# Patient Record
Sex: FEMALE | Race: WHITE | NOT HISPANIC OR LATINO | Employment: FULL TIME | URBAN - METROPOLITAN AREA
[De-identification: names, ages, dates, MRNs, and addresses within clinical notes are randomized per-mention and may not be internally consistent; named-entity substitution may affect disease eponyms.]

---

## 2017-09-08 ENCOUNTER — OFFICE VISIT (OUTPATIENT)
Dept: OBSTETRICS AND GYNECOLOGY | Facility: CLINIC | Age: 42
End: 2017-09-08
Payer: COMMERCIAL

## 2017-09-08 VITALS
DIASTOLIC BLOOD PRESSURE: 76 MMHG | BODY MASS INDEX: 27.21 KG/M2 | HEIGHT: 64 IN | WEIGHT: 159.38 LBS | SYSTOLIC BLOOD PRESSURE: 122 MMHG

## 2017-09-08 DIAGNOSIS — Z01.419 WELL WOMAN EXAM WITH ROUTINE GYNECOLOGICAL EXAM: ICD-10-CM

## 2017-09-08 DIAGNOSIS — Z12.31 VISIT FOR SCREENING MAMMOGRAM: ICD-10-CM

## 2017-09-08 DIAGNOSIS — Z12.4 ROUTINE PAPANICOLAOU SMEAR: Primary | ICD-10-CM

## 2017-09-08 PROCEDURE — 99999 PR PBB SHADOW E&M-EST. PATIENT-LVL II: CPT | Mod: PBBFAC,,, | Performed by: OBSTETRICS & GYNECOLOGY

## 2017-09-08 PROCEDURE — 88175 CYTOPATH C/V AUTO FLUID REDO: CPT

## 2017-09-08 PROCEDURE — 99396 PREV VISIT EST AGE 40-64: CPT | Mod: S$GLB,,, | Performed by: OBSTETRICS & GYNECOLOGY

## 2017-09-08 NOTE — PROGRESS NOTES
CC: Annual check-up    SUBJECTIVE:   42 y.o. female   for annual routine Pap and checkup. Patient's last menstrual period was 2017 (exact date)..  She has no unusual complaints.        History reviewed. No pertinent past medical history.  History reviewed. No pertinent surgical history.  Social History     Social History    Marital status:      Spouse name: N/A    Number of children: N/A    Years of education: N/A     Occupational History    Not on file.     Social History Main Topics    Smoking status: Current Every Day Smoker    Smokeless tobacco: Current User    Alcohol use Yes    Drug use: No    Sexual activity: Yes     Partners: Male     Birth control/ protection: None     Other Topics Concern    Not on file     Social History Narrative    No narrative on file     History reviewed. No pertinent family history.  OB History    Para Term  AB Living   2 2 1 1   2   SAB TAB Ectopic Multiple Live Births           2      # Outcome Date GA Lbr [place]/2nd Weight Sex Delivery Anes PTL Lv   2 Term 09   3.118 kg (6 lb 14 oz) M Vag-Spont  N ADAM   1  06 35w0d  2.325 kg (5 lb 2 oz) F Vag-Spont  Y ADAM            No current outpatient prescriptions on file.     No current facility-administered medications for this visit.      Allergies: Review of patient's allergies indicates no known allergies.     ROS:  Constitutional: no weight loss, weight gain, fever, fatigue  Eyes:  No vision changes, glasses/contacts  ENT/Mouth: No ulcers, sinus problems, ears ringing, headache  Cardiovascular: No inability to lie flat, chest pain, exercise intolerance, swelling, heart palpitations  Respiratory: No wheezing, coughing blood, shortness of breath, or cough  Gastrointestinal: No diarrhea, bloody stool, nausea/vomiting, constipation, gas, hemorrhoids  Genitourinary: No blood in urine, painful urination, urgency of urination, frequency of urination, incomplete emptying, incontinence,  "abnormal bleeding, painful periods, heavy periods, vaginal discharge, vaginal odor, painful intercourse, sexual problems, bleeding after intercourse.  Musculoskeletal: No muscle weakness  Skin/Breast: No painful breasts, nipple discharge, masses, rash, ulcers  Neurological: No passing out, seizures, numbness, headache  Endocrine: No diabetes, hypothyroid, hyperthyroid, hot flashes, hair loss, abnormal hair growth, ance  Psychiatric: No depression, crying  Hematologic: No bruises, bleeding, swollen lymph nodes, anemia.      OBJECTIVE:   The patient appears well, alert, oriented x 3, in no distress.  /76   Ht 5' 4" (1.626 m)   Wt 72.3 kg (159 lb 6.4 oz)   LMP 08/22/2017 (Exact Date)   BMI 27.36 kg/m²   NECK: no thyromegaly, trachea midline  SKIN: no acne, striae, hirsutism  BREAST EXAM: breasts appear normal, no suspicious masses, no skin or nipple changes or axillary nodes, symmetric fibrous changes in both upper outer quadrants  ABDOMEN: no hernias, masses, or hepatosplenomegaly  GENITALIA: normal external genitalia, no erythema, no discharge  URETHRA: normal urethra, normal urethral meatus  VAGINA: Normal  CERVIX: no lesions or cervical motion tenderness  UTERUS: normal  ADNEXA: normal adnexa      ASSESSMENT:   well woman  1. Routine Papanicolaou smear    2. Visit for screening mammogram        PLAN:   mammogram  pap smear  return annually or prn     "

## 2017-09-20 ENCOUNTER — HOSPITAL ENCOUNTER (OUTPATIENT)
Dept: RADIOLOGY | Facility: HOSPITAL | Age: 42
Discharge: HOME | End: 2017-09-20
Attending: OBSTETRICS & GYNECOLOGY
Payer: COMMERCIAL

## 2017-09-20 VITALS — WEIGHT: 159 LBS | BODY MASS INDEX: 27.14 KG/M2 | HEIGHT: 64 IN

## 2017-09-20 DIAGNOSIS — Z12.31 VISIT FOR SCREENING MAMMOGRAM: ICD-10-CM

## 2017-09-20 PROCEDURE — 77067 SCR MAMMO BI INCL CAD: CPT | Mod: TC

## 2018-02-21 ENCOUNTER — OFFICE VISIT (OUTPATIENT)
Dept: FAMILY MEDICINE | Facility: CLINIC | Age: 43
End: 2018-02-21
Payer: COMMERCIAL

## 2018-02-21 VITALS
BODY MASS INDEX: 28.54 KG/M2 | SYSTOLIC BLOOD PRESSURE: 120 MMHG | TEMPERATURE: 98 F | HEIGHT: 64 IN | DIASTOLIC BLOOD PRESSURE: 82 MMHG | OXYGEN SATURATION: 98 % | HEART RATE: 85 BPM | WEIGHT: 167.19 LBS

## 2018-02-21 DIAGNOSIS — M25.561 CHRONIC PAIN OF BOTH KNEES: ICD-10-CM

## 2018-02-21 DIAGNOSIS — G89.29 CHRONIC PAIN OF BOTH KNEES: ICD-10-CM

## 2018-02-21 DIAGNOSIS — E66.3 OVERWEIGHT: Primary | ICD-10-CM

## 2018-02-21 DIAGNOSIS — M25.562 CHRONIC PAIN OF BOTH KNEES: ICD-10-CM

## 2018-02-21 PROCEDURE — 99213 OFFICE O/P EST LOW 20 MIN: CPT | Mod: S$GLB,,, | Performed by: FAMILY MEDICINE

## 2018-02-21 PROCEDURE — 3008F BODY MASS INDEX DOCD: CPT | Mod: S$GLB,,, | Performed by: FAMILY MEDICINE

## 2018-02-21 RX ORDER — PHENTERMINE HYDROCHLORIDE 37.5 MG/1
37.5 CAPSULE ORAL EVERY MORNING
Qty: 30 CAPSULE | Refills: 2 | Status: SHIPPED | OUTPATIENT
Start: 2018-02-21 | End: 2018-03-23

## 2018-02-21 NOTE — PROGRESS NOTES
Subjective:      Patient ID: Jessika Sorto is a 42 y.o. female.    Chief Complaint: Weight Gain    Gained weight; wants help to lose; 16 pound documented almost 2 years; snack, sit at work; starting to walk; has 8 year old son; 11 also; had phentermin 2 years ago; reasonable weight goal 140; decrease beer; exercise; cardio; her knees hurt this prevents her from running.  She is not tried Weight Watchers yet she needs to quit smoking      Review of Systems   Constitutional: Positive for unexpected weight change.   HENT: Negative.    Respiratory: Negative.    Cardiovascular: Negative.    Gastrointestinal: Negative.    Endocrine: Negative.    Genitourinary: Negative.    Musculoskeletal: Positive for arthralgias.   Psychiatric/Behavioral: Negative.    All other systems reviewed and are negative.    Objective:     Physical Exam   Constitutional: She is oriented to person, place, and time. She appears well-developed and well-nourished.   Overweight   HENT:   Head: Normocephalic.   Eyes: Conjunctivae and EOM are normal. Pupils are equal, round, and reactive to light.   Neck: Normal range of motion. Neck supple.   Cardiovascular: Normal rate, regular rhythm and normal heart sounds.    Pulmonary/Chest: Effort normal and breath sounds normal.   Musculoskeletal: Normal range of motion.   Neurological: She is alert and oriented to person, place, and time. She has normal reflexes.   Skin: Skin is warm and dry.   Psychiatric: She has a normal mood and affect. Her behavior is normal. Judgment and thought content normal.   Nursing note and vitals reviewed.    Assessment:     1. Overweight    2. Chronic pain of both knees      Plan:     New Prescriptions    PHENTERMINE 37.5 MG CAPSULE    Take 1 capsule (37.5 mg total) by mouth every morning.     Discontinued Medications    No medications on file     Modified Medications    No medications on file       Overweight  Comments:  Phentermine exercise Weight [employer]chers    Chronic pain of  both knees    Other orders  -     phentermine 37.5 MG capsule; Take 1 capsule (37.5 mg total) by mouth every morning.  Dispense: 30 capsule; Refill: 2      [facility] [employer] quit smoking exercise return to clinic in 3 months

## 2018-10-11 ENCOUNTER — OFFICE VISIT (OUTPATIENT)
Dept: FAMILY MEDICINE | Facility: CLINIC | Age: 43
End: 2018-10-11
Payer: COMMERCIAL

## 2018-10-11 VITALS
OXYGEN SATURATION: 98 % | HEIGHT: 64 IN | HEART RATE: 75 BPM | TEMPERATURE: 98 F | BODY MASS INDEX: 26.81 KG/M2 | SYSTOLIC BLOOD PRESSURE: 116 MMHG | DIASTOLIC BLOOD PRESSURE: 78 MMHG | WEIGHT: 157.06 LBS

## 2018-10-11 DIAGNOSIS — M54.50 ACUTE MIDLINE LOW BACK PAIN WITHOUT SCIATICA: Primary | ICD-10-CM

## 2018-10-11 PROCEDURE — 99213 OFFICE O/P EST LOW 20 MIN: CPT | Mod: S$GLB,,, | Performed by: FAMILY MEDICINE

## 2018-10-11 RX ORDER — METHOCARBAMOL 750 MG/1
TABLET, FILM COATED ORAL
COMMUNITY
Start: 2018-09-21 | End: 2019-02-15
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
## 2019-02-15 ENCOUNTER — OFFICE VISIT (OUTPATIENT)
Dept: FAMILY MEDICINE | Facility: CLINIC | Age: 44
End: 2019-02-15
Payer: COMMERCIAL

## 2019-02-15 VITALS
HEART RATE: 106 BPM | SYSTOLIC BLOOD PRESSURE: 120 MMHG | HEIGHT: 64 IN | WEIGHT: 167.69 LBS | OXYGEN SATURATION: 99 % | TEMPERATURE: 98 F | BODY MASS INDEX: 28.63 KG/M2 | DIASTOLIC BLOOD PRESSURE: 80 MMHG

## 2019-02-15 DIAGNOSIS — Z23 NEED FOR TDAP VACCINATION: ICD-10-CM

## 2019-02-15 DIAGNOSIS — K59.00 CONSTIPATION, UNSPECIFIED CONSTIPATION TYPE: Primary | ICD-10-CM

## 2019-02-15 DIAGNOSIS — Z23 NEED FOR PNEUMOCOCCAL VACCINATION: ICD-10-CM

## 2019-02-15 PROCEDURE — 99213 PR OFFICE/OUTPT VISIT, EST, LEVL III, 20-29 MIN: ICD-10-PCS | Mod: S$GLB,,, | Performed by: FAMILY MEDICINE

## 2019-02-15 PROCEDURE — 99213 OFFICE O/P EST LOW 20 MIN: CPT | Mod: S$GLB,,, | Performed by: FAMILY MEDICINE

## 2019-02-15 RX ORDER — GABAPENTIN 100 MG/1
100 CAPSULE ORAL 3 TIMES DAILY
Qty: 90 CAPSULE | Refills: 11 | Status: SHIPPED | OUTPATIENT
Start: 2019-02-15 | End: 2022-01-03

## 2019-02-15 RX ORDER — NITROFURANTOIN 25; 75 MG/1; MG/1
CAPSULE ORAL
COMMUNITY
Start: 2019-02-12 | End: 2022-01-03

## 2019-02-18 ENCOUNTER — TELEPHONE (OUTPATIENT)
Dept: FAMILY MEDICINE | Facility: CLINIC | Age: 44
End: 2019-02-18

## 2019-02-18 DIAGNOSIS — R10.9 ABDOMINAL PAIN, UNSPECIFIED ABDOMINAL LOCATION: Primary | ICD-10-CM

## 2019-02-18 PROCEDURE — 90732 PPSV23 VACC 2 YRS+ SUBQ/IM: CPT | Mod: S$GLB,,, | Performed by: FAMILY MEDICINE

## 2019-02-18 PROCEDURE — 90471 PNEUMOCOCCAL POLYSACCHARIDE VACCINE 23-VALENT =>2YO SQ IM: ICD-10-PCS | Mod: S$GLB,,, | Performed by: FAMILY MEDICINE

## 2019-02-18 PROCEDURE — 90715 TDAP VACCINE 7 YRS/> IM: CPT | Mod: S$GLB,,, | Performed by: FAMILY MEDICINE

## 2019-02-18 PROCEDURE — 90472 TDAP VACCINE GREATER THAN OR EQUAL TO 7YO IM: ICD-10-PCS | Mod: S$GLB,,, | Performed by: FAMILY MEDICINE

## 2019-02-18 PROCEDURE — 90715 TDAP VACCINE GREATER THAN OR EQUAL TO 7YO IM: ICD-10-PCS | Mod: S$GLB,,, | Performed by: FAMILY MEDICINE

## 2019-02-18 PROCEDURE — 90471 IMMUNIZATION ADMIN: CPT | Mod: S$GLB,,, | Performed by: FAMILY MEDICINE

## 2019-02-18 PROCEDURE — 90472 IMMUNIZATION ADMIN EACH ADD: CPT | Mod: S$GLB,,, | Performed by: FAMILY MEDICINE

## 2019-02-18 PROCEDURE — 90732 PNEUMOCOCCAL POLYSACCHARIDE VACCINE 23-VALENT =>2YO SQ IM: ICD-10-PCS | Mod: S$GLB,,, | Performed by: FAMILY MEDICINE

## 2019-02-18 NOTE — PROGRESS NOTES
Patient ID: Jessika Sorto is a 43 y.o. female.    Chief Complaint: Back Pain (hip pain  )    HPI      Jessika Sorto is a 43 y.o. female complains of pain along her right side radiating anteriorly.  Feels like nerve ending a shocking pain. No rash ever developed.  No change in her breathing pattern.  No cough or congestion. No urinary symptoms.    Abdominal-mild pain mid epigastric area.  Does state that decreased bowel movements increased caliber increased hardness            Review of Symptoms    Constitutional  Neg activity change, No chills /fever   Resp  Neg hemoptysis, stridor, choking  CVS  Neg chest pain, palpitations    Physical Exam    Constitutional:  Oriented to person, place, and time.appears well-developed and well-nourished.  No distress.      HENT  Head: Normocephalic and atraumatic  Right Ear: External ear normal.   Left Ear: External ear normal.   Nose: External nose normal.   Mouth:  Moist mucus membranes.    Eyes:  Conjunctivae are normal. Right eye exhibits no discharge.  Left eye exhibits no discharge. No scleral icterus.  No periorbital edema    Cardiovascular:  Regular rate and rhythm with normal S1 and S2     Pulmonary/Chest:   Clear to auscultation bilaterally without wheezes, rhonchi or rales      Musculoskeletal:  No edema. No obvious deformity No wasting      Abdomen:  Mildly tender epigastric area no rebound no guarding no hepatosplenomegaly, no rashes observed      Neurological:  Alert and oriented to person, place, and time.   Coordination normal.     Skin:   Skin is warm and dry.  No diaphoresis.   No rash noted.     Psychiatric: Normal mood and affect. Behavior is normal.  Judgment and thought content normal.     Complete Blood Count  Lab Results   Component Value Date    RBC 4.64 05/25/2016    HGB 14.7 05/25/2016    HCT 43.3 05/25/2016    MCV 93 05/25/2016    MCH 31.7 (H) 05/25/2016    MCHC 33.9 05/25/2016    RDW 14.2 05/25/2016     05/25/2016    MPV 11.0 05/25/2016     GRAN 5.1 05/25/2016    GRAN 58.8 05/25/2016    LYMPH 2.6 05/25/2016    LYMPH 29.8 05/25/2016    MONO 0.7 05/25/2016    MONO 7.9 05/25/2016    EOS 0.3 05/25/2016    BASO 0.02 05/25/2016    EOSINOPHIL 3.0 05/25/2016    BASOPHIL 0.2 05/25/2016    DIFFMETHOD Automated 05/25/2016       Comprehensive Metabolic Panel  No results found for: GLU, BUN, CREATININE, NA, K, CL, PROT, ALBUMIN, BILITOT, AST, ALKPHOS, CO2, ALT, ANIONGAP, EGFRNONAA, ESTGFRAFRICA    TSH  No results found for: TSH    Assessment / Plan:      ICD-10-CM ICD-9-CM   1. Constipation, unspecified constipation type K59.00 564.00   2. Need for pneumococcal vaccination Z23 V03.82   3. Need for Tdap vaccination Z23 V06.1     Constipation, unspecified constipation type    Need for pneumococcal vaccination  -     Pneumococcal Polysaccharide Vaccine (23 Valent) (SQ/IM)    Need for Tdap vaccination  -     Tdap Vaccine    Other orders  -     gabapentin (NEURONTIN) 100 MG capsule; Take 1 capsule (100 mg total) by mouth 3 (three) times daily.  Dispense: 90 capsule; Refill: 11      Unsure of the reason for the neuropathic pain-continues consider workup with imaging  Consider a complete along the right side-gallbladder disease hepatic disease pancreatic disease-if not resolved in 2-3 days with the use of laxative-consider testing-labs-imaging

## 2019-02-18 NOTE — TELEPHONE ENCOUNTER
FYI  You asked pt to update on Monday regarding the pain she was having    Pt states she is still having some pain on her side  Not feeling nauseated as much  Please advise

## 2019-02-19 ENCOUNTER — PATIENT MESSAGE (OUTPATIENT)
Dept: FAMILY MEDICINE | Facility: CLINIC | Age: 44
End: 2019-02-19

## 2019-02-19 ENCOUNTER — HOSPITAL ENCOUNTER (OUTPATIENT)
Dept: RADIOLOGY | Facility: HOSPITAL | Age: 44
Discharge: HOME | End: 2019-02-19
Attending: FAMILY MEDICINE
Payer: COMMERCIAL

## 2019-02-19 ENCOUNTER — TELEPHONE (OUTPATIENT)
Dept: FAMILY MEDICINE | Facility: CLINIC | Age: 44
End: 2019-02-19

## 2019-02-19 DIAGNOSIS — R10.9 ABDOMINAL PAIN, UNSPECIFIED ABDOMINAL LOCATION: ICD-10-CM

## 2019-02-19 PROCEDURE — 74019 RADEX ABDOMEN 2 VIEWS: CPT | Mod: TC,FY,PO

## 2019-02-19 NOTE — TELEPHONE ENCOUNTER
----- Message from Reina Solano sent at 2/19/2019  2:06 PM CST -----  Contact: [phone]/self  Patient called in returning your call. Please advise.

## 2019-11-27 ENCOUNTER — PATIENT OUTREACH (OUTPATIENT)
Dept: ADMINISTRATIVE | Facility: HOSPITAL | Age: 44
End: 2019-11-27

## 2022-01-03 ENCOUNTER — OFFICE VISIT (OUTPATIENT)
Dept: FAMILY MEDICINE | Facility: CLINIC | Age: 47
End: 2022-01-03
Payer: COMMERCIAL

## 2022-01-03 VITALS
TEMPERATURE: 98 F | SYSTOLIC BLOOD PRESSURE: 138 MMHG | DIASTOLIC BLOOD PRESSURE: 86 MMHG | OXYGEN SATURATION: 97 % | HEART RATE: 99 BPM | HEIGHT: 64 IN | BODY MASS INDEX: 28.07 KG/M2 | WEIGHT: 164.44 LBS

## 2022-01-03 DIAGNOSIS — I10 PRIMARY HYPERTENSION: Primary | ICD-10-CM

## 2022-01-03 DIAGNOSIS — Z12.31 SCREENING MAMMOGRAM FOR HIGH-RISK PATIENT: ICD-10-CM

## 2022-01-03 PROCEDURE — 99214 OFFICE O/P EST MOD 30 MIN: CPT | Mod: S$GLB,,, | Performed by: STUDENT IN AN ORGANIZED HEALTH CARE EDUCATION/TRAINING PROGRAM

## 2022-01-03 PROCEDURE — 99214 PR OFFICE/OUTPT VISIT, EST, LEVL IV, 30-39 MIN: ICD-10-PCS | Mod: S$GLB,,, | Performed by: STUDENT IN AN ORGANIZED HEALTH CARE EDUCATION/TRAINING PROGRAM

## 2022-01-03 RX ORDER — LOSARTAN POTASSIUM 25 MG/1
50 TABLET ORAL DAILY
Qty: 45 TABLET | Refills: 3 | Status: SHIPPED | OUTPATIENT
Start: 2022-01-03 | End: 2022-01-10

## 2022-01-03 NOTE — PROGRESS NOTES
" Patient ID: Jessika Sorto is a 46 y.o. female.     Chief Complaint: Hypertension    Hypertension  This is a new problem. The current episode started 1 to 4 weeks ago. The problem is unchanged. The problem is uncontrolled. Pertinent negatives include no anxiety, chest pain, headaches, malaise/fatigue, neck pain, orthopnea, palpitations, peripheral edema, PND, shortness of breath or sweats. Agents associated with hypertension include decongestants. Past treatments include nothing.      patient has been checking her blood pressure at home and systolic pressure is consistently > 130.     Review of Systems  Review of Systems   Constitutional: Negative for fever and malaise/fatigue.   HENT: Negative for ear pain and sinus pain.    Eyes: Negative for discharge.   Respiratory: Negative for cough and shortness of breath.    Cardiovascular: Negative for chest pain, palpitations, orthopnea, leg swelling and PND.   Gastrointestinal: Negative for diarrhea, nausea and vomiting.   Genitourinary: Negative for urgency.   Musculoskeletal: Negative for myalgias and neck pain.   Skin: Negative for rash.   Neurological: Negative for weakness and headaches.   Psychiatric/Behavioral: Negative for depression.   All other systems reviewed and are negative.      Currently Medications  Current Outpatient Medications on File Prior to Visit   Medication Sig Dispense Refill    [DISCONTINUED] gabapentin (NEURONTIN) 100 MG capsule Take 1 capsule (100 mg total) by mouth 3 (three) times daily. 90 capsule 11    [DISCONTINUED] nitrofurantoin, macrocrystal-monohydrate, (MACROBID) 100 MG capsule        No current facility-administered medications on file prior to visit.       Physical  Exam  Vitals:    01/03/22 1457   BP: 138/86   BP Location: Right arm   Patient Position: Sitting   Pulse: 99   Temp: 98.3 °F (36.8 °C)   SpO2: 97%   Weight: 74.6 kg (164 lb 7.4 oz)   Height: 5' 4" (1.626 m)      Body mass index is 28.23 kg/m².    Physical Exam  Vitals " and nursing note reviewed.   Constitutional:       General: She is not in acute distress.     Appearance: She is not ill-appearing.   HENT:      Head: Normocephalic and atraumatic.      Right Ear: External ear normal.      Left Ear: External ear normal.      Nose: Nose normal.      Mouth/Throat:      Mouth: Mucous membranes are moist.   Eyes:      Extraocular Movements: Extraocular movements intact.      Conjunctiva/sclera: Conjunctivae normal.   Cardiovascular:      Rate and Rhythm: Normal rate and regular rhythm.      Pulses: Normal pulses.      Heart sounds: No murmur heard.      Pulmonary:      Effort: Pulmonary effort is normal. No respiratory distress.      Breath sounds: No wheezing.   Abdominal:      General: There is no distension.      Palpations: Abdomen is soft. There is no mass.      Tenderness: There is no abdominal tenderness.   Musculoskeletal:         General: No swelling.      Cervical back: Normal range of motion.   Skin:     Coloration: Skin is not jaundiced.      Findings: No rash.   Neurological:      General: No focal deficit present.      Mental Status: She is alert and oriented to person, place, and time.   Psychiatric:         Mood and Affect: Mood normal.         Thought Content: Thought content normal.         Labs:    Complete Blood Count  Lab Results   Component Value Date    RBC 5.05 02/19/2019    HGB 15.4 02/19/2019    HCT 47.2 02/19/2019    MCV 94 02/19/2019    MCH 30.5 02/19/2019    MCHC 32.6 02/19/2019    RDW 13.6 02/19/2019     02/19/2019    MPV 10.8 02/19/2019    GRAN 4.6 02/19/2019    GRAN 60.1 02/19/2019    LYMPH 1.9 02/19/2019    LYMPH 24.8 02/19/2019    MONO 0.9 02/19/2019    MONO 11.4 02/19/2019    EOS 0.2 02/19/2019    BASO 0.03 02/19/2019    EOSINOPHIL 3.0 02/19/2019    BASOPHIL 0.4 02/19/2019    DIFFMETHOD Automated 02/19/2019       Comprehensive Metabolic Panel  No results found for: GLU, BUN, CREATININE, NA, K, CL, PROT, ALBUMIN, BILITOT, AST, ALKPHOS, CO2, ALT,  ANIONGAP, EGFRNONAA, ESTGFRAFRICA    TSH  No results found for: TSH    Imaging:  X-Ray Abdomen Flat And Erect  Narrative: EXAMINATION:  XR ABDOMEN FLAT AND ERECT    CLINICAL HISTORY:  Unspecified abdominal pain    COMPARISON:  None    FINDINGS:  The bowel gas pattern is normal in appearance.  There is a 4 mm oval shaped calcific density projected over the expected location of the midpole of the left kidney.  There is no pneumoperitoneum.  There is a mild amount of dextroconvex curvature of the lumbar spine.  There are Essure devices projected over the pelvis.  Impression: 1. There is a 4 mm oval shaped calcific density projected over the expected location of the midpole of the left kidney.  This is characteristic of nephrolithiasis.  2. There is a mild amount of dextroconvex curvature of the lumbar spine.  3. There are Essure devices projected over the pelvis.  4. The bowel gas pattern is normal in appearance.    Electronically signed by: Braydon Thakur MD  Date:    02/19/2019  Time:    09:54      Assessment/Plan:    Problem List Items Addressed This Visit        Cardiac/Vascular    Primary hypertension - Primary    Current Assessment & Plan     - start losartan today  - RTC 1 week for BP check and to order BMP for kidney function           Other Visit Diagnoses     Screening mammogram for high-risk patient        Relevant Orders    Mammo Digital Screening Bilat w/ [facility]           Discussed how to stay healthy including: diet, exercise, refraining from smoking and discussed screening exams / tests needed for age, sex and family Hx.    RTC 1 week    Sabino Hung MD

## 2022-01-10 ENCOUNTER — LAB VISIT (OUTPATIENT)
Dept: LAB | Facility: HOSPITAL | Age: 47
End: 2022-01-10
Attending: STUDENT IN AN ORGANIZED HEALTH CARE EDUCATION/TRAINING PROGRAM
Payer: COMMERCIAL

## 2022-01-10 ENCOUNTER — OFFICE VISIT (OUTPATIENT)
Dept: FAMILY MEDICINE | Facility: CLINIC | Age: 47
End: 2022-01-10
Payer: COMMERCIAL

## 2022-01-10 ENCOUNTER — PATIENT MESSAGE (OUTPATIENT)
Dept: ADMINISTRATIVE | Facility: HOSPITAL | Age: 47
End: 2022-01-10
Payer: COMMERCIAL

## 2022-01-10 VITALS
TEMPERATURE: 98 F | OXYGEN SATURATION: 98 % | HEIGHT: 64 IN | BODY MASS INDEX: 28.12 KG/M2 | SYSTOLIC BLOOD PRESSURE: 132 MMHG | HEART RATE: 93 BPM | DIASTOLIC BLOOD PRESSURE: 82 MMHG | WEIGHT: 164.69 LBS

## 2022-01-10 DIAGNOSIS — I10 PRIMARY HYPERTENSION: ICD-10-CM

## 2022-01-10 DIAGNOSIS — I10 PRIMARY HYPERTENSION: Primary | ICD-10-CM

## 2022-01-10 LAB
ALBUMIN SERPL BCP-MCNC: 4.1 G/DL (ref 3.5–5.2)
ALP SERPL-CCNC: 66 U/L (ref 38–126)
ALT SERPL W/O P-5'-P-CCNC: 19 U/L (ref 10–44)
ANION GAP SERPL CALC-SCNC: 8 MMOL/L (ref 8–16)
AST SERPL-CCNC: 20 U/L (ref 15–46)
BASOPHILS # BLD AUTO: 0.04 K/UL (ref 0–0.2)
BASOPHILS NFR BLD: 0.5 % (ref 0–1.9)
BILIRUB SERPL-MCNC: 0.7 MG/DL (ref 0.1–1)
CALCIUM SERPL-MCNC: 8.7 MG/DL (ref 8.7–10.5)
CHLORIDE SERPL-SCNC: 103 MMOL/L (ref 95–110)
CO2 SERPL-SCNC: 28 MMOL/L (ref 23–29)
CREAT SERPL-MCNC: 0.67 MG/DL (ref 0.5–1.4)
DIFFERENTIAL METHOD: ABNORMAL
EOSINOPHIL # BLD AUTO: 0.2 K/UL (ref 0–0.5)
EOSINOPHIL NFR BLD: 1.9 % (ref 0–8)
ERYTHROCYTE [DISTWIDTH] IN BLOOD BY AUTOMATED COUNT: 12.5 % (ref 11.5–14.5)
EST. GFR  (AFRICAN AMERICAN): >60 ML/MIN/1.73 M^2
EST. GFR  (NON AFRICAN AMERICAN): >60 ML/MIN/1.73 M^2
GLUCOSE SERPL-MCNC: 151 MG/DL (ref 70–110)
HCT VFR BLD AUTO: 47 % (ref 37–48.5)
HGB BLD-MCNC: 15.9 G/DL (ref 12–16)
IMM GRANULOCYTES # BLD AUTO: 0.02 K/UL (ref 0–0.04)
IMM GRANULOCYTES NFR BLD AUTO: 0.2 % (ref 0–0.5)
LYMPHOCYTES # BLD AUTO: 2.6 K/UL (ref 1–4.8)
LYMPHOCYTES NFR BLD: 29.7 % (ref 18–48)
MCH RBC QN AUTO: 32.3 PG (ref 27–31)
MCHC RBC AUTO-ENTMCNC: 33.8 G/DL (ref 32–36)
MCV RBC AUTO: 95 FL (ref 82–98)
MONOCYTES # BLD AUTO: 0.8 K/UL (ref 0.3–1)
MONOCYTES NFR BLD: 8.7 % (ref 4–15)
NEUTROPHILS # BLD AUTO: 5.1 K/UL (ref 1.8–7.7)
NEUTROPHILS NFR BLD: 59 % (ref 38–73)
NRBC BLD-RTO: 0 /100 WBC
PLATELET # BLD AUTO: 354 K/UL (ref 150–450)
PMV BLD AUTO: 10.1 FL (ref 9.2–12.9)
POTASSIUM SERPL-SCNC: 4.2 MMOL/L (ref 3.5–5.1)
PROT SERPL-MCNC: 7 G/DL (ref 6–8.4)
RBC # BLD AUTO: 4.93 M/UL (ref 4–5.4)
SODIUM SERPL-SCNC: 139 MMOL/L (ref 136–145)
UUN UR-MCNC: 9 MG/DL (ref 7–17)
WBC # BLD AUTO: 8.62 K/UL (ref 3.9–12.7)

## 2022-01-10 PROCEDURE — 36415 COLL VENOUS BLD VENIPUNCTURE: CPT | Mod: PO | Performed by: STUDENT IN AN ORGANIZED HEALTH CARE EDUCATION/TRAINING PROGRAM

## 2022-01-10 PROCEDURE — 85025 COMPLETE CBC W/AUTO DIFF WBC: CPT | Mod: PO | Performed by: STUDENT IN AN ORGANIZED HEALTH CARE EDUCATION/TRAINING PROGRAM

## 2022-01-10 PROCEDURE — 99214 PR OFFICE/OUTPT VISIT, EST, LEVL IV, 30-39 MIN: ICD-10-PCS | Mod: S$GLB,,, | Performed by: STUDENT IN AN ORGANIZED HEALTH CARE EDUCATION/TRAINING PROGRAM

## 2022-01-10 PROCEDURE — 80053 COMPREHEN METABOLIC PANEL: CPT | Mod: PO | Performed by: STUDENT IN AN ORGANIZED HEALTH CARE EDUCATION/TRAINING PROGRAM

## 2022-01-10 PROCEDURE — 99214 OFFICE O/P EST MOD 30 MIN: CPT | Mod: S$GLB,,, | Performed by: STUDENT IN AN ORGANIZED HEALTH CARE EDUCATION/TRAINING PROGRAM

## 2022-01-10 RX ORDER — LOSARTAN POTASSIUM 100 MG/1
100 TABLET ORAL DAILY
Qty: 90 TABLET | Refills: 3 | Status: SHIPPED | OUTPATIENT
Start: 2022-01-10 | End: 2023-05-01

## 2022-01-10 NOTE — PROGRESS NOTES
Patient ID: Jessika Sorto is a 46 y.o. female.     Chief Complaint: Follow-up    Hypertension  This is a new problem. The current episode started 1 to 4 weeks ago. The problem has been gradually improving since onset. The problem is controlled. Pertinent negatives include no anxiety, blurred vision, chest pain, headaches, malaise/fatigue, neck pain, orthopnea, palpitations, peripheral edema, PND, shortness of breath or sweats. There are no associated agents to hypertension. Past treatments include angiotensin blockers. The current treatment provides mild improvement. There are no compliance problems.  There is no history of angina, CVA, heart failure or left ventricular hypertrophy.      patient reports systolic blood pressure is consistently in the 140s in the afternoons.     Review of Systems  Review of Systems   Constitutional: Negative for fever and malaise/fatigue.   HENT: Negative for ear pain and sinus pain.    Eyes: Negative for blurred vision and discharge.   Respiratory: Negative for cough and shortness of breath.    Cardiovascular: Negative for chest pain, palpitations, orthopnea, leg swelling and PND.   Gastrointestinal: Negative for diarrhea, nausea and vomiting.   Genitourinary: Negative for urgency.   Musculoskeletal: Negative for myalgias and neck pain.   Skin: Negative for rash.   Neurological: Negative for weakness and headaches.   Psychiatric/Behavioral: Negative for depression.   All other systems reviewed and are negative.      Currently Medications  Current Outpatient Medications on File Prior to Visit   Medication Sig Dispense Refill    [DISCONTINUED] losartan (COZAAR) 25 MG tablet Take 2 tablets (50 mg total) by mouth once daily. 45 tablet 3     No current facility-administered medications on file prior to visit.       Physical  Exam  Vitals:    01/10/22 0956   BP: 132/82   BP Location: Right arm   Patient Position: Sitting   Pulse: 93   Temp: 98.1 °F (36.7 °C)   SpO2: 98%   Weight: 74.7  "kg (164 lb 10.9 oz)   Height: 5' 4" (1.626 m)      Body mass index is 28.27 kg/m².    Physical Exam  Vitals and nursing note reviewed.   Constitutional:       General: She is not in acute distress.     Appearance: She is not ill-appearing.   HENT:      Head: Normocephalic and atraumatic.      Right Ear: External ear normal.      Left Ear: External ear normal.      Nose: Nose normal.      Mouth/Throat:      Mouth: Mucous membranes are moist.   Eyes:      Extraocular Movements: Extraocular movements intact.      Conjunctiva/sclera: Conjunctivae normal.   Cardiovascular:      Rate and Rhythm: Normal rate and regular rhythm.      Pulses: Normal pulses.      Heart sounds: No murmur heard.      Pulmonary:      Effort: Pulmonary effort is normal. No respiratory distress.      Breath sounds: No wheezing.   Abdominal:      General: There is no distension.      Palpations: Abdomen is soft. There is no mass.      Tenderness: There is no abdominal tenderness.   Musculoskeletal:         General: No swelling.      Cervical back: Normal range of motion.   Skin:     Coloration: Skin is not jaundiced.      Findings: No rash.   Neurological:      General: No focal deficit present.      Mental Status: She is alert and oriented to person, place, and time.   Psychiatric:         Mood and Affect: Mood normal.         Thought Content: Thought content normal.         Labs:    Complete Blood Count  Lab Results   Component Value Date    RBC 5.05 02/19/2019    HGB 15.4 02/19/2019    HCT 47.2 02/19/2019    MCV 94 02/19/2019    MCH 30.5 02/19/2019    MCHC 32.6 02/19/2019    RDW 13.6 02/19/2019     02/19/2019    MPV 10.8 02/19/2019    GRAN 4.6 02/19/2019    GRAN 60.1 02/19/2019    LYMPH 1.9 02/19/2019    LYMPH 24.8 02/19/2019    MONO 0.9 02/19/2019    MONO 11.4 02/19/2019    EOS 0.2 02/19/2019    BASO 0.03 02/19/2019    EOSINOPHIL 3.0 02/19/2019    BASOPHIL 0.4 02/19/2019    DIFFMETHOD Automated 02/19/2019       Comprehensive Metabolic " Panel  No results found for: GLU, BUN, CREATININE, NA, K, CL, PROT, ALBUMIN, BILITOT, AST, ALKPHOS, CO2, ALT, ANIONGAP, EGFRNONAA, ESTGFRAFRICA    TSH  No results found for: TSH    Imaging:  X-Ray Abdomen Flat And Erect  Narrative: EXAMINATION:  XR ABDOMEN FLAT AND ERECT    CLINICAL HISTORY:  Unspecified abdominal pain    COMPARISON:  None    FINDINGS:  The bowel gas pattern is normal in appearance.  There is a 4 mm oval shaped calcific density projected over the expected location of the midpole of the left kidney.  There is no pneumoperitoneum.  There is a mild amount of dextroconvex curvature of the lumbar spine.  There are Essure devices projected over the pelvis.  Impression: 1. There is a 4 mm oval shaped calcific density projected over the expected location of the midpole of the left kidney.  This is characteristic of nephrolithiasis.  2. There is a mild amount of dextroconvex curvature of the lumbar spine.  3. There are Essure devices projected over the pelvis.  4. The bowel gas pattern is normal in appearance.    Electronically signed by: Braydon Thakur MD  Date:    02/19/2019  Time:    09:54      Assessment/Plan:    Problem List Items Addressed This Visit        Cardiac/Vascular    Primary hypertension - Primary    Current Assessment & Plan     - increase losartan to 100mg daily  - check BMP today         Relevant Orders    CBC Auto Differential    Comprehensive Metabolic Panel        Sabino Hung MD

## 2022-02-15 ENCOUNTER — HOSPITAL ENCOUNTER (OUTPATIENT)
Dept: RADIOLOGY | Facility: HOSPITAL | Age: 47
Discharge: HOME | End: 2022-02-15
Attending: STUDENT IN AN ORGANIZED HEALTH CARE EDUCATION/TRAINING PROGRAM
Payer: COMMERCIAL

## 2022-02-15 DIAGNOSIS — Z12.31 SCREENING MAMMOGRAM FOR HIGH-RISK PATIENT: ICD-10-CM

## 2022-02-15 PROCEDURE — 77067 SCR MAMMO BI INCL CAD: CPT | Mod: TC,PO

## 2022-02-15 PROCEDURE — 77063 BREAST TOMOSYNTHESIS BI: CPT | Mod: TC,PO

## 2022-02-16 ENCOUNTER — PATIENT OUTREACH (OUTPATIENT)
Dept: ADMINISTRATIVE | Facility: OTHER | Age: 47
End: 2022-02-16
Payer: COMMERCIAL

## 2022-02-16 NOTE — PROGRESS NOTES
Health Maintenance Due   Topic Date Due    Hepatitis C Screening  Never done    HIV Screening  Never done    Cervical Cancer Screening  09/08/2020    Influenza Vaccine (1) 09/01/2021    COVID-19 Vaccine (3 - Booster for Moderna series) 02/25/2022     Updates were requested from care everywhere.  Chart was reviewed for overdue Proactive [facility] Encounters ([facility]ON[facility]) topics (CRS, Breast Cancer Screening, Eye exam)  Health Maintenance has been updated.  LINKS immunization registry triggered.  Immunizations were reconciled.

## 2022-02-18 ENCOUNTER — OFFICE VISIT (OUTPATIENT)
Dept: OBSTETRICS AND GYNECOLOGY | Facility: CLINIC | Age: 47
End: 2022-02-18
Payer: COMMERCIAL

## 2022-02-18 VITALS
WEIGHT: 164.19 LBS | SYSTOLIC BLOOD PRESSURE: 128 MMHG | DIASTOLIC BLOOD PRESSURE: 84 MMHG | BODY MASS INDEX: 28.18 KG/M2

## 2022-02-18 DIAGNOSIS — Z01.419 WELL WOMAN EXAM WITH ROUTINE GYNECOLOGICAL EXAM: Primary | ICD-10-CM

## 2022-02-18 PROCEDURE — 88141 CYTOPATH C/V INTERPRET: CPT | Mod: ,,, | Performed by: PATHOLOGY

## 2022-02-18 PROCEDURE — 87624 HPV HI-RISK TYP POOLED RSLT: CPT | Performed by: OBSTETRICS & GYNECOLOGY

## 2022-02-18 PROCEDURE — 88141 PR  CYTOPATH CERV/VAG INTERPRET: ICD-10-PCS | Mod: ,,, | Performed by: PATHOLOGY

## 2022-02-18 PROCEDURE — 99999 PR PBB SHADOW E&M-EST. PATIENT-LVL III: ICD-10-PCS | Mod: PBBFAC,,, | Performed by: OBSTETRICS & GYNECOLOGY

## 2022-02-18 PROCEDURE — 99999 PR PBB SHADOW E&M-EST. PATIENT-LVL III: CPT | Mod: PBBFAC,,, | Performed by: OBSTETRICS & GYNECOLOGY

## 2022-02-18 PROCEDURE — 99386 PR PREVENTIVE VISIT,NEW,40-64: ICD-10-PCS | Mod: S$GLB,,, | Performed by: OBSTETRICS & GYNECOLOGY

## 2022-02-18 PROCEDURE — 99386 PREV VISIT NEW AGE 40-64: CPT | Mod: S$GLB,,, | Performed by: OBSTETRICS & GYNECOLOGY

## 2022-02-18 PROCEDURE — 88175 CYTOPATH C/V AUTO FLUID REDO: CPT | Performed by: PATHOLOGY

## 2022-02-18 NOTE — PROGRESS NOTES
CC: Annual check-up    SUBJECTIVE:   46 y.o. female   for annual routine Pap and checkup. Patient's last menstrual period was 2022 (exact date)..  She has no unusual complaints and cycles are spacing out.      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 No pertinent past medical history.  Past Surgical History:   Procedure Laterality Date    OTHER SURGICAL HISTORY      e sure coils  pregnancy prevention     Social History     Socioeconomic History    Marital status:    Occupational History     Comment: [employer]   Tobacco Use    Smoking status: Current Every Day Smoker     Packs/day: 0.50    Smokeless tobacco: Current User    Tobacco comment: quit smoking   Substance and Sexual Activity    Alcohol use: Yes     Comment: weekend beer    Drug use: No    Sexual activity: Yes     Partners: Male     Birth control/protection: None     Family History   Problem Relation Age of Onset    Diabetes Mother     Hypertension Father     Hyperlipidemia Father     Kidney disease Sister     No Known Problems Brother     No Known Problems Daughter     No Known Problems Son     No Known Problems Sister      OB History    Para Term  AB Living   3 3 2 1   2   SAB IAB Ectopic Multiple Live Births           2      # Outcome Date GA Lbr [place]/2nd Weight Sex Delivery Anes PTL Lv   3 Term 09   3.118 kg (6 lb 14 oz) M Vag-Spont  N ADAM   2  06 35w0d  2.325 kg (5 lb 2 oz) F Vag-Spont  Y ADAM   1 Term                  Current Outpatient Medications   Medication Sig Dispense Refill    losartan (COZAAR) 100 MG tablet Take 1 tablet (100 mg total) by mouth once daily. 90 tablet 3     No current facility-administered medications for this visit.     Allergies: Patient has no known allergies.     ROS:  Constitutional: no weight loss, weight gain, fever, fatigue  Eyes:  No vision changes,  glasses/contacts  ENT/Mouth: No ulcers, sinus problems, ears ringing, headache  Cardiovascular: No inability to lie flat, chest pain, exercise intolerance, swelling, heart palpitations  Respiratory: No wheezing, coughing blood, shortness of breath, or cough  Gastrointestinal: No diarrhea, bloody stool, nausea/vomiting, constipation, gas, hemorrhoids  Genitourinary: No blood in urine, painful urination, urgency of urination, frequency of urination, incomplete emptying, incontinence, abnormal bleeding, painful periods, heavy periods, vaginal discharge, vaginal odor, painful intercourse, sexual problems, bleeding after intercourse.  Musculoskeletal: No muscle weakness  Skin/Breast: No painful breasts, nipple discharge, masses, rash, ulcers  Neurological: No passing out, seizures, numbness, headache  Endocrine: No diabetes, hypothyroid, hyperthyroid, hot flashes, hair loss, abnormal hair growth, ance  Psychiatric: No depression, crying  Hematologic: No bruises, bleeding, swollen lymph nodes, anemia.      OBJECTIVE:   The patient appears well, alert, oriented x 3, in no distress.  /84 (BP Location: Left arm, Patient Position: Sitting, BP Method: Medium (Manual))   Wt 74.5 kg (164 lb 3.2 oz)   LMP 02/13/2022 (Exact Date)   BMI 28.18 kg/m²   NECK: no thyromegaly, trachea midline  SKIN: no acne, striae, hirsutism  BREAST EXAM: not examined  ABDOMEN: no hernias, masses, or hepatosplenomegaly  GENITALIA: normal external genitalia, no erythema, no discharge  URETHRA: normal urethra, normal urethral meatus  VAGINA: Normal  CERVIX: no lesions or cervical motion tenderness and blleeds easily  UTERUS: normal  ADNEXA: normal adnexa and no mass, fullness, tenderness      ASSESSMENT:   well woman  1. Well woman exam with routine gynecological exam        PLAN:   Mammogram done this week nml  pap smear  return annually or prn  Orders Placed This Encounter    Liquid-Based Pap Smear, Screening

## 2022-02-28 LAB
FINAL PATHOLOGIC DIAGNOSIS: ABNORMAL
Lab: ABNORMAL

## 2022-03-03 LAB
HPV HR 12 DNA SPEC QL NAA+PROBE: NEGATIVE
HPV16 AG SPEC QL: NEGATIVE
HPV18 DNA SPEC QL NAA+PROBE: NEGATIVE

## 2022-05-25 ENCOUNTER — PATIENT MESSAGE (OUTPATIENT)
Dept: SMOKING CESSATION | Facility: CLINIC | Age: 47
End: 2022-05-25
Payer: COMMERCIAL

## 2022-06-15 ENCOUNTER — TELEPHONE (OUTPATIENT)
Dept: FAMILY MEDICINE | Facility: CLINIC | Age: 47
End: 2022-06-15
Payer: COMMERCIAL

## 2022-06-15 NOTE — TELEPHONE ENCOUNTER
----- Message from Shey Julio sent at 6/15/2022  1:36 PM CDT -----  Contact: [phone]  Type:  Same Day Appointment Request    Caller is requesting a same day appointment.  Caller declined first available appointment listed below.    Name of Caller:Pt  When is the first available appointment?7/13  Symptoms:pain in right Abdomin that started on yesterday  Best Call Back Number:[phone]

## 2022-06-16 ENCOUNTER — OFFICE VISIT (OUTPATIENT)
Dept: FAMILY MEDICINE | Facility: CLINIC | Age: 47
End: 2022-06-16
Payer: COMMERCIAL

## 2022-06-16 VITALS
TEMPERATURE: 98 F | BODY MASS INDEX: 28.14 KG/M2 | SYSTOLIC BLOOD PRESSURE: 132 MMHG | HEIGHT: 64 IN | DIASTOLIC BLOOD PRESSURE: 78 MMHG | WEIGHT: 164.81 LBS | HEART RATE: 94 BPM | OXYGEN SATURATION: 97 %

## 2022-06-16 DIAGNOSIS — R10.31 RIGHT LOWER QUADRANT ABDOMINAL PAIN: Primary | ICD-10-CM

## 2022-06-16 PROCEDURE — 99213 OFFICE O/P EST LOW 20 MIN: CPT | Mod: S$GLB,,, | Performed by: STUDENT IN AN ORGANIZED HEALTH CARE EDUCATION/TRAINING PROGRAM

## 2022-06-16 PROCEDURE — 99213 PR OFFICE/OUTPT VISIT, EST, LEVL III, 20-29 MIN: ICD-10-PCS | Mod: S$GLB,,, | Performed by: STUDENT IN AN ORGANIZED HEALTH CARE EDUCATION/TRAINING PROGRAM

## 2022-06-18 NOTE — PROGRESS NOTES
" Patient ID: Jessika Sorto is a 46 y.o. female.     Chief Complaint: Abdominal Pain    Abdominal Pain  This is a new problem. Episode onset: 2 days ago. The onset quality is sudden. The problem occurs intermittently. The problem has been unchanged. The pain is located in the RLQ. The pain is at a severity of 7/10. The pain is moderate. The quality of the pain is sharp and colicky. The abdominal pain does not radiate. Pertinent negatives include no arthralgias, diarrhea, dysuria, fever, flatus, frequency, headaches, hematochezia, myalgias, nausea or vomiting. Nothing aggravates the pain. The pain is relieved by nothing. She has tried acetaminophen for the symptoms. The treatment provided mild relief.          Review of Systems  Review of Systems   Constitutional: Negative for fever.   HENT: Negative for ear pain and sinus pain.    Eyes: Negative for discharge.   Respiratory: Negative for cough and shortness of breath.    Cardiovascular: Negative for chest pain and leg swelling.   Gastrointestinal: Positive for abdominal pain. Negative for diarrhea, flatus, hematochezia, nausea and vomiting.   Genitourinary: Negative for dysuria, frequency and urgency.   Musculoskeletal: Negative for arthralgias and myalgias.   Skin: Negative for rash.   Neurological: Negative for weakness and headaches.   Psychiatric/Behavioral: Negative for depression.   All other systems reviewed and are negative.      Currently Medications  Current Outpatient Medications on File Prior to Visit   Medication Sig Dispense Refill    losartan (COZAAR) 100 MG tablet Take 1 tablet (100 mg total) by mouth once daily. 90 tablet 3     No current facility-administered medications on file prior to visit.       Physical  Exam  Vitals:    06/16/22 1559   BP: 132/78   BP Location: Left arm   Patient Position: Sitting   Pulse: 94   Temp: 98.1 °F (36.7 °C)   SpO2: 97%   Weight: 74.7 kg (164 lb 12.7 oz)   Height: 5' 4" (1.626 m)      Body mass index is 28.29 " kg/m².    Physical Exam  Vitals and nursing note reviewed.   Constitutional:       General: She is not in acute distress.     Appearance: She is not ill-appearing.   HENT:      Head: Normocephalic and atraumatic.      Right Ear: External ear normal.      Left Ear: External ear normal.      Nose: Nose normal.      Mouth/Throat:      Mouth: Mucous membranes are moist.   Eyes:      Extraocular Movements: Extraocular movements intact.      Conjunctiva/sclera: Conjunctivae normal.   Cardiovascular:      Rate and Rhythm: Normal rate and regular rhythm.      Pulses: Normal pulses.      Heart sounds: No murmur heard.  Pulmonary:      Effort: Pulmonary effort is normal. No respiratory distress.      Breath sounds: No wheezing.   Abdominal:      General: There is no distension.      Palpations: Abdomen is soft. There is no mass.      Tenderness: There is abdominal tenderness in the right lower quadrant.   Musculoskeletal:         General: No swelling.      Cervical back: Normal range of motion.   Skin:     Coloration: Skin is not jaundiced.      Findings: No rash.   Neurological:      General: No focal deficit present.      Mental Status: She is alert and oriented to person, place, and time.   Psychiatric:         Mood and Affect: Mood normal.         Thought Content: Thought content normal.         Labs:    Complete Blood Count  Lab Results   Component Value Date    RBC 4.93 01/10/2022    HGB 15.9 01/10/2022    HCT 47.0 01/10/2022    MCV 95 01/10/2022    MCH 32.3 (H) 01/10/2022    MCHC 33.8 01/10/2022    RDW 12.5 01/10/2022     01/10/2022    MPV 10.1 01/10/2022    GRAN 5.1 01/10/2022    GRAN 59.0 01/10/2022    LYMPH 2.6 01/10/2022    LYMPH 29.7 01/10/2022    MONO 0.8 01/10/2022    MONO 8.7 01/10/2022    EOS 0.2 01/10/2022    BASO 0.04 01/10/2022    EOSINOPHIL 1.9 01/10/2022    BASOPHIL 0.5 01/10/2022    DIFFMETHOD Automated 01/10/2022       Comprehensive Metabolic Panel  Lab Results   Component Value Date      (H) 01/10/2022    BUN 9 01/10/2022    CREATININE 0.67 01/10/2022     01/10/2022    K 4.2 01/10/2022     01/10/2022    PROT 7.0 01/10/2022    ALBUMIN 4.1 01/10/2022    BILITOT 0.7 01/10/2022    AST 20 01/10/2022    ALKPHOS 66 01/10/2022    CO2 28 01/10/2022    ALT 19 01/10/2022    ANIONGAP 8 01/10/2022    EGFRNONAA >60.0 01/10/2022    ESTGFRAFRICA >60.0 01/10/2022       TSH  No results found for: TSH    Imaging:  Mammo Digital Screening Bilat w/ Marcus  Narrative: Result:  Mammo Digital Screening Bilat w/ Marcus    History:  Patient is 46 y.o. and is seen for a screening mammogram.    Films Compared:  Prior images (if available) were compared.     Findings:   This procedure was performed using tomosynthesis.   Computer-aided detection was utilized in the interpretation of this   examination.    The breasts have scattered areas of fibroglandular density. There is no   evidence of suspicious masses, microcalcifications or architectural   distortion.  Impression:    No mammographic evidence of malignancy.    BI-RADS Category 1: Negative    Recommendation:  Routine screening mammogram in 1 year is recommended.    Your estimated lifetime risk of breast cancer (to age 85) based on   Tyrer-Cuzick risk assessment model is 7.39 %.  According to the American   Cancer Society, patients with a lifetime breast cancer risk of 20% or   higher might benefit from supplemental screening tests. ??       Assessment/Plan:    Problem List Items Addressed This Visit    None     Visit Diagnoses     Right lower quadrant abdominal pain    -  Primary          Ddx: ovarian cyst, muscle strain, appendicitis.     Alternate tylenol and ibuprofen. Apply warm compress.     If patient develops intractable pain, nausea, or vomiting go to the ER.      Discussed how to stay healthy including: diet, exercise, refraining from smoking and discussed screening exams / tests needed for age, sex and family Hx.        Sabino Hung MD

## 2022-06-22 ENCOUNTER — PATIENT MESSAGE (OUTPATIENT)
Dept: OBSTETRICS AND GYNECOLOGY | Facility: CLINIC | Age: 47
End: 2022-06-22
Payer: COMMERCIAL

## 2022-06-22 ENCOUNTER — TELEPHONE (OUTPATIENT)
Dept: OBSTETRICS AND GYNECOLOGY | Facility: CLINIC | Age: 47
End: 2022-06-22
Payer: COMMERCIAL

## 2022-06-22 DIAGNOSIS — R10.2 PELVIC PAIN: Primary | ICD-10-CM

## 2022-06-30 ENCOUNTER — HOSPITAL ENCOUNTER (OUTPATIENT)
Dept: RADIOLOGY | Facility: HOSPITAL | Age: 47
Discharge: HOME | End: 2022-06-30
Attending: OBSTETRICS & GYNECOLOGY
Payer: COMMERCIAL

## 2022-06-30 DIAGNOSIS — R10.2 PELVIC PAIN: ICD-10-CM

## 2022-06-30 PROCEDURE — 76830 TRANSVAGINAL US NON-OB: CPT | Mod: TC,PO

## 2022-07-01 ENCOUNTER — PATIENT MESSAGE (OUTPATIENT)
Dept: OBSTETRICS AND GYNECOLOGY | Facility: CLINIC | Age: 47
End: 2022-07-01
Payer: COMMERCIAL

## 2022-07-01 NOTE — TELEPHONE ENCOUNTER
Nabothian cysts on the cervix will prob not go away and they dont cause any problems  Ovaries look good so it is essentially a nml u/s  It doesn't appear to be anything GYN causing pain  Let me know if still having pain and we can see her in office to evaluate it

## 2022-11-14 ENCOUNTER — PATIENT MESSAGE (OUTPATIENT)
Dept: FAMILY MEDICINE | Facility: CLINIC | Age: 47
End: 2022-11-14
Payer: COMMERCIAL

## 2022-11-21 ENCOUNTER — PATIENT MESSAGE (OUTPATIENT)
Dept: ADMINISTRATIVE | Facility: HOSPITAL | Age: 47
End: 2022-11-21
Payer: COMMERCIAL

## 2022-11-23 ENCOUNTER — PATIENT OUTREACH (OUTPATIENT)
Dept: ADMINISTRATIVE | Facility: HOSPITAL | Age: 47
End: 2022-11-23
Payer: COMMERCIAL

## 2022-11-23 NOTE — PROGRESS NOTES
Care Everywhere updates requested and reviewed.  Immunizations reconciled. Media reports reviewed.  Duplicate HM overrides and  orders removed.  Overdue HM topic chart audit and/or requested.  Overdue lab testing linked to upcoming lab appointments if applies.    Lab elsa and Vuze reviewed for Lab testing       Health Maintenance Due   Topic Date Due    Hepatitis C Screening  Never done    HIV Screening  Never done    Pneumococcal Vaccines (Age 0-64) (2 - PCV) 2020    Lipid Panel  2021    COVID-19 Vaccine (3 - Booster for Moderna series) 10/20/2021    Colorectal Cancer Screening  Never done    Influenza Vaccine (1) 2022

## 2022-11-25 ENCOUNTER — OFFICE VISIT (OUTPATIENT)
Dept: FAMILY MEDICINE | Facility: CLINIC | Age: 47
End: 2022-11-25
Payer: COMMERCIAL

## 2022-11-25 VITALS
BODY MASS INDEX: 28.17 KG/M2 | TEMPERATURE: 98 F | DIASTOLIC BLOOD PRESSURE: 84 MMHG | WEIGHT: 165 LBS | OXYGEN SATURATION: 97 % | HEART RATE: 76 BPM | HEIGHT: 64 IN | SYSTOLIC BLOOD PRESSURE: 132 MMHG

## 2022-11-25 DIAGNOSIS — E88.810 METABOLIC SYNDROME: Primary | ICD-10-CM

## 2022-11-25 DIAGNOSIS — Z23 NEED FOR INFLUENZA VACCINATION: ICD-10-CM

## 2022-11-25 DIAGNOSIS — I10 PRIMARY HYPERTENSION: ICD-10-CM

## 2022-11-25 DIAGNOSIS — Z12.11 COLON CANCER SCREENING: ICD-10-CM

## 2022-11-25 DIAGNOSIS — Z23 NEED FOR STREPTOCOCCUS PNEUMONIAE VACCINATION: ICD-10-CM

## 2022-11-25 PROCEDURE — 99213 PR OFFICE/OUTPT VISIT, EST, LEVL III, 20-29 MIN: ICD-10-PCS | Mod: 25,S$GLB,, | Performed by: STUDENT IN AN ORGANIZED HEALTH CARE EDUCATION/TRAINING PROGRAM

## 2022-11-25 PROCEDURE — 90686 FLU VACCINE (QUAD) GREATER THAN OR EQUAL TO 3YO PRESERVATIVE FREE IM: ICD-10-PCS | Mod: S$GLB,,, | Performed by: STUDENT IN AN ORGANIZED HEALTH CARE EDUCATION/TRAINING PROGRAM

## 2022-11-25 PROCEDURE — 90471 FLU VACCINE (QUAD) GREATER THAN OR EQUAL TO 3YO PRESERVATIVE FREE IM: ICD-10-PCS | Mod: S$GLB,,, | Performed by: STUDENT IN AN ORGANIZED HEALTH CARE EDUCATION/TRAINING PROGRAM

## 2022-11-25 PROCEDURE — 90677 PNEUMOCOCCAL CONJUGATE VACCINE 20-VALENT: ICD-10-PCS | Mod: S$GLB,,, | Performed by: STUDENT IN AN ORGANIZED HEALTH CARE EDUCATION/TRAINING PROGRAM

## 2022-11-25 PROCEDURE — 90472 PNEUMOCOCCAL CONJUGATE VACCINE 20-VALENT: ICD-10-PCS | Mod: S$GLB,,, | Performed by: STUDENT IN AN ORGANIZED HEALTH CARE EDUCATION/TRAINING PROGRAM

## 2022-11-25 PROCEDURE — 90677 PCV20 VACCINE IM: CPT | Mod: S$GLB,,, | Performed by: STUDENT IN AN ORGANIZED HEALTH CARE EDUCATION/TRAINING PROGRAM

## 2022-11-25 PROCEDURE — 90686 IIV4 VACC NO PRSV 0.5 ML IM: CPT | Mod: S$GLB,,, | Performed by: STUDENT IN AN ORGANIZED HEALTH CARE EDUCATION/TRAINING PROGRAM

## 2022-11-25 PROCEDURE — 90471 IMMUNIZATION ADMIN: CPT | Mod: S$GLB,,, | Performed by: STUDENT IN AN ORGANIZED HEALTH CARE EDUCATION/TRAINING PROGRAM

## 2022-11-25 PROCEDURE — 90472 IMMUNIZATION ADMIN EACH ADD: CPT | Mod: S$GLB,,, | Performed by: STUDENT IN AN ORGANIZED HEALTH CARE EDUCATION/TRAINING PROGRAM

## 2022-11-25 PROCEDURE — 99213 OFFICE O/P EST LOW 20 MIN: CPT | Mod: 25,S$GLB,, | Performed by: STUDENT IN AN ORGANIZED HEALTH CARE EDUCATION/TRAINING PROGRAM

## 2022-11-25 RX ORDER — TIRZEPATIDE 2.5 MG/.5ML
2.5 INJECTION, SOLUTION SUBCUTANEOUS
Qty: 4 PEN | Refills: 0 | Status: SHIPPED | OUTPATIENT
Start: 2022-11-25 | End: 2022-12-19 | Stop reason: DRUGHIGH

## 2022-11-25 RX ORDER — TIRZEPATIDE 2.5 MG/.5ML
2.5 INJECTION, SOLUTION SUBCUTANEOUS
Qty: 4 PEN | Refills: 0 | Status: SHIPPED | OUTPATIENT
Start: 2022-11-25 | End: 2022-11-25

## 2022-11-25 NOTE — PROGRESS NOTES
" Patient ID: Jessika Sorto is a 47 y.o. female.     Chief Complaint: Weight Loss    HPI   Patient here requesting prescription for mounjaro to help her lose weight. She has been dieting and exercising without success. She denies recent chest pain or shortness of breath. She has no history of pancreatitis. She has no personal or family history of thyroid cancer or MEN syndrome.     Review of Systems  Review of Systems   Constitutional:  Negative for fever.   HENT:  Negative for ear pain and sinus pain.    Eyes:  Negative for discharge.   Respiratory:  Negative for cough and shortness of breath.    Cardiovascular:  Negative for chest pain and leg swelling.   Gastrointestinal:  Negative for diarrhea, nausea and vomiting.   Genitourinary:  Negative for urgency.   Musculoskeletal:  Negative for myalgias.   Skin:  Negative for rash.   Neurological:  Negative for weakness and headaches.   Psychiatric/Behavioral:  Negative for depression.    All other systems reviewed and are negative.    Currently Medications  Current Outpatient Medications on File Prior to Visit   Medication Sig Dispense Refill    losartan (COZAAR) 100 MG tablet Take 1 tablet (100 mg total) by mouth once daily. 90 tablet 3     No current facility-administered medications on file prior to visit.       Physical  Exam  Vitals:    11/25/22 1426   BP: 132/84   BP Location: Right arm   Patient Position: Sitting   Pulse: 76   Temp: 98.1 °F (36.7 °C)   TempSrc: Oral   SpO2: 97%   Weight: 74.9 kg (165 lb 0.2 oz)   Height: 5' 4" (1.626 m)      Body mass index is 28.32 kg/m².    Physical Exam  Vitals and nursing note reviewed.   Constitutional:       General: She is not in acute distress.     Appearance: She is not ill-appearing.   HENT:      Head: Normocephalic and atraumatic.      Right Ear: External ear normal.      Left Ear: External ear normal.      Nose: Nose normal.      Mouth/Throat:      Mouth: Mucous membranes are moist.   Eyes:      Extraocular " Movements: Extraocular movements intact.      Conjunctiva/sclera: Conjunctivae normal.   Cardiovascular:      Rate and Rhythm: Normal rate and regular rhythm.      Pulses: Normal pulses.      Heart sounds: No murmur heard.  Pulmonary:      Effort: Pulmonary effort is normal. No respiratory distress.      Breath sounds: No wheezing.   Abdominal:      General: There is no distension.      Palpations: Abdomen is soft. There is no mass.      Tenderness: There is no abdominal tenderness.   Musculoskeletal:         General: No swelling.      Cervical back: Normal range of motion.   Skin:     Coloration: Skin is not jaundiced.      Findings: No rash.   Neurological:      General: No focal deficit present.      Mental Status: She is alert and oriented to person, place, and time.   Psychiatric:         Mood and Affect: Mood normal.         Thought Content: Thought content normal.       Labs:    Complete Blood Count  Lab Results   Component Value Date    RBC 4.93 01/10/2022    HGB 15.9 01/10/2022    HCT 47.0 01/10/2022    MCV 95 01/10/2022    MCH 32.3 (H) 01/10/2022    MCHC 33.8 01/10/2022    RDW 12.5 01/10/2022     01/10/2022    MPV 10.1 01/10/2022    GRAN 5.1 01/10/2022    GRAN 59.0 01/10/2022    LYMPH 2.6 01/10/2022    LYMPH 29.7 01/10/2022    MONO 0.8 01/10/2022    MONO 8.7 01/10/2022    EOS 0.2 01/10/2022    BASO 0.04 01/10/2022    EOSINOPHIL 1.9 01/10/2022    BASOPHIL 0.5 01/10/2022    DIFFMETHOD Automated 01/10/2022       Comprehensive Metabolic Panel  No results found for: GLU, BUN, CREATININE, NA, K, CL, PROT, ALBUMIN, BILITOT, AST, ALKPHOS, CO2, ALT, ANIONGAP, EGFRNONAA, ESTGFRAFRICA    TSH  No results found for: TSH    Imaging:  US Pelvis Comp with Transvag NON-OB (xpd  Narrative: EXAMINATION:  US PELVIS COMP WITH TRANSVAG NON-OB (XPD)    CLINICAL HISTORY:  Pelvic pain    TECHNIQUE:  Multiple static ultrasound images are submitted for interpretation with color flow and spectral Doppler  imaging.    COMPARISON:  None    FINDINGS:  The uterus measures 9.5 cm in craniocaudal dimension by 3.6 cm in AP dimension by 5.9 cm in mediolateral dimension.  The endometrial stripe thickness measures 7 mm.  There are several cysts in the cervical portion of the uterus.  One the larger ones measures 7 mm.    The ovaries are normal in appearance.  The left ovary measures 3.6 cm by 2.6 cm x 2.5 cm.  The left ovary has a prominent 19 mm follicle.  The right ovary measures 3.0 cm x 1.8 cm x 1.9 cm.  The right ovary has arterial flow with a peak systolic velocity of 6 centimeters/second.  The right ovary has a prominent 13 mm follicle.    The urinary bladder is normal in appearance.  There is no free fluid visualized within the pelvis.  Impression: 1. There are several cysts in the cervical portion of the uterus. One the larger ones measures 7 mm.  These are characteristic of nabothian cyst.  2. There are prominent follicles in both ovaries.  Otherwise, the ovaries are normal in appearance.    Electronically signed by: Braydon Thakur MD  Date:    06/30/2022  Time:    11:08      Assessment/Plan:    1. Metabolic syndrome  -     tirzepatide (MOUNJARO) 2.5 mg/0.5 mL PnIj; Inject 2.5 mg into the skin every 7 days.  Dispense: 4 pen; Refill: 0    2. Colon cancer screening  -     Cologuard Screening (Multitarget Stool DNA); Future; Expected date: 11/25/2022    3. Primary hypertension    4. Need for Streptococcus pneumoniae vaccination  -     (In Office Administered) Pneumococcal Conjugate Vaccine (20 Valent) (IM)    5. Need for influenza vaccination  -     Influenza - Quadrivalent *Preferred* (6 months+) (PF)    Other orders  -     Discontinue: tirzepatide (MOUNJARO) 2.5 mg/0.5 mL PnIj; Inject 2.5 mg into the skin every 7 days.  Dispense: 4 pen; Refill: 0       Discussed how to stay healthy including: diet, exercise, refraining from smoking and discussed screening exams / tests needed for age, sex and family Hx.        Sabino  MD Anaebll

## 2022-12-19 ENCOUNTER — PATIENT MESSAGE (OUTPATIENT)
Dept: FAMILY MEDICINE | Facility: CLINIC | Age: 47
End: 2022-12-19
Payer: COMMERCIAL

## 2022-12-19 RX ORDER — TIRZEPATIDE 5 MG/.5ML
5 INJECTION, SOLUTION SUBCUTANEOUS
Qty: 4 PEN | Refills: 0 | Status: SHIPPED | OUTPATIENT
Start: 2022-12-19 | End: 2023-01-17 | Stop reason: DRUGHIGH

## 2022-12-20 DIAGNOSIS — R19.5 POSITIVE COLORECTAL CANCER SCREENING USING COLOGUARD TEST: Primary | ICD-10-CM

## 2022-12-20 LAB — NONINV COLON CA DNA+OCC BLD SCRN STL QL: POSITIVE

## 2022-12-21 ENCOUNTER — OFFICE VISIT (OUTPATIENT)
Dept: FAMILY MEDICINE | Facility: CLINIC | Age: 47
End: 2022-12-21
Payer: COMMERCIAL

## 2022-12-21 ENCOUNTER — TELEPHONE (OUTPATIENT)
Dept: ENDOSCOPY | Facility: HOSPITAL | Age: 47
End: 2022-12-21
Payer: COMMERCIAL

## 2022-12-21 VITALS
HEART RATE: 92 BPM | SYSTOLIC BLOOD PRESSURE: 122 MMHG | WEIGHT: 160.94 LBS | HEIGHT: 64 IN | BODY MASS INDEX: 27.48 KG/M2 | OXYGEN SATURATION: 97 % | TEMPERATURE: 99 F | DIASTOLIC BLOOD PRESSURE: 80 MMHG

## 2022-12-21 DIAGNOSIS — J02.9 PHARYNGITIS, UNSPECIFIED ETIOLOGY: Primary | ICD-10-CM

## 2022-12-21 LAB
CTP QC/QA: YES
MOLECULAR STREP A: NEGATIVE
POC MOLECULAR INFLUENZA A AGN: NEGATIVE
POC MOLECULAR INFLUENZA B AGN: NEGATIVE
SARS-COV-2 RDRP RESP QL NAA+PROBE: NEGATIVE

## 2022-12-21 PROCEDURE — 96372 THER/PROPH/DIAG INJ SC/IM: CPT | Mod: S$GLB,,, | Performed by: STUDENT IN AN ORGANIZED HEALTH CARE EDUCATION/TRAINING PROGRAM

## 2022-12-21 PROCEDURE — 87651 POCT STREP A MOLECULAR: ICD-10-PCS | Mod: QW,,, | Performed by: STUDENT IN AN ORGANIZED HEALTH CARE EDUCATION/TRAINING PROGRAM

## 2022-12-21 PROCEDURE — 87651 STREP A DNA AMP PROBE: CPT | Mod: QW,,, | Performed by: STUDENT IN AN ORGANIZED HEALTH CARE EDUCATION/TRAINING PROGRAM

## 2022-12-21 PROCEDURE — 87502 INFLUENZA DNA AMP PROBE: CPT | Mod: QW,,, | Performed by: STUDENT IN AN ORGANIZED HEALTH CARE EDUCATION/TRAINING PROGRAM

## 2022-12-21 PROCEDURE — 99214 PR OFFICE/OUTPT VISIT, EST, LEVL IV, 30-39 MIN: ICD-10-PCS | Mod: 25,S$GLB,, | Performed by: STUDENT IN AN ORGANIZED HEALTH CARE EDUCATION/TRAINING PROGRAM

## 2022-12-21 PROCEDURE — 87635: ICD-10-PCS | Mod: QW,S$GLB,, | Performed by: STUDENT IN AN ORGANIZED HEALTH CARE EDUCATION/TRAINING PROGRAM

## 2022-12-21 PROCEDURE — 96372 PR INJECTION,THERAP/PROPH/DIAG2ST, IM OR SUBCUT: ICD-10-PCS | Mod: S$GLB,,, | Performed by: STUDENT IN AN ORGANIZED HEALTH CARE EDUCATION/TRAINING PROGRAM

## 2022-12-21 PROCEDURE — 87635 SARS-COV-2 COVID-19 AMP PRB: CPT | Mod: QW,S$GLB,, | Performed by: STUDENT IN AN ORGANIZED HEALTH CARE EDUCATION/TRAINING PROGRAM

## 2022-12-21 PROCEDURE — 99214 OFFICE O/P EST MOD 30 MIN: CPT | Mod: 25,S$GLB,, | Performed by: STUDENT IN AN ORGANIZED HEALTH CARE EDUCATION/TRAINING PROGRAM

## 2022-12-21 PROCEDURE — 87502 POCT INFLUENZA A/B MOLECULAR: ICD-10-PCS | Mod: QW,,, | Performed by: STUDENT IN AN ORGANIZED HEALTH CARE EDUCATION/TRAINING PROGRAM

## 2022-12-21 RX ORDER — TRIAMCINOLONE ACETONIDE 40 MG/ML
80 INJECTION, SUSPENSION INTRA-ARTICULAR; INTRAMUSCULAR ONCE
Status: COMPLETED | OUTPATIENT
Start: 2022-12-21 | End: 2022-12-21

## 2022-12-21 RX ADMIN — TRIAMCINOLONE ACETONIDE 80 MG: 40 INJECTION, SUSPENSION INTRA-ARTICULAR; INTRAMUSCULAR at 04:12

## 2022-12-21 NOTE — PROGRESS NOTES
Patient ID: Jessika Sorto is a 47 y.o. female.     Chief Complaint: Cough and Sore Throat    Cough  This is a new problem. The current episode started in the past 7 days. The problem has been unchanged. The problem occurs every few minutes. The cough is Non-productive. Associated symptoms include nasal congestion, postnasal drip and a sore throat. Pertinent negatives include no chest pain, ear congestion, ear pain, fever, headaches, myalgias, rash, shortness of breath, sweats, weight loss or wheezing. She has tried OTC cough suppressant for the symptoms. The treatment provided no relief.   Sore Throat   This is a new problem. The current episode started in the past 7 days. The problem has been unchanged. Neither side of throat is experiencing more pain than the other. There has been no fever. The pain is mild. Associated symptoms include coughing. Pertinent negatives include no diarrhea, ear pain, headaches, neck pain, shortness of breath, stridor, swollen glands or vomiting. She has had no exposure to strep or mono. She has tried NSAIDs for the symptoms. The treatment provided mild relief.        Review of Systems  Review of Systems   Constitutional:  Negative for fever and weight loss.   HENT:  Positive for postnasal drip and sore throat. Negative for ear pain and sinus pain.    Eyes:  Negative for discharge.   Respiratory:  Positive for cough. Negative for shortness of breath, wheezing and stridor.    Cardiovascular:  Negative for chest pain and leg swelling.   Gastrointestinal:  Negative for diarrhea, nausea and vomiting.   Genitourinary:  Negative for urgency.   Musculoskeletal:  Negative for myalgias and neck pain.   Skin:  Negative for rash.   Neurological:  Negative for weakness and headaches.   Psychiatric/Behavioral:  Negative for depression.    All other systems reviewed and are negative.    Currently Medications  Current Outpatient Medications on File Prior to Visit   Medication Sig Dispense Refill     "losartan (COZAAR) 100 MG tablet Take 1 tablet (100 mg total) by mouth once daily. 90 tablet 3    tirzepatide (MOUNJARO) 5 mg/0.5 mL PnIj Inject 5 mg into the skin every 7 days. 4 pen 0     No current facility-administered medications on file prior to visit.       Physical  Exam  Vitals:    12/21/22 1409   BP: 122/80   BP Location: Left arm   Patient Position: Sitting   Pulse: 92   Temp: 98.5 °F (36.9 °C)   SpO2: 97%   Weight: 73 kg (160 lb 15 oz)   Height: 5' 4" (1.626 m)      Body mass index is 27.62 kg/m².    Physical Exam  Vitals and nursing note reviewed.   Constitutional:       General: She is not in acute distress.     Appearance: She is not ill-appearing.   HENT:      Head: Normocephalic and atraumatic.      Right Ear: External ear normal.      Left Ear: External ear normal.      Nose: Nose normal.      Mouth/Throat:      Mouth: Mucous membranes are moist.      Pharynx: Posterior oropharyngeal erythema present.   Eyes:      Extraocular Movements: Extraocular movements intact.      Conjunctiva/sclera: Conjunctivae normal.   Cardiovascular:      Rate and Rhythm: Normal rate and regular rhythm.      Pulses: Normal pulses.      Heart sounds: No murmur heard.  Pulmonary:      Effort: Pulmonary effort is normal. No respiratory distress.      Breath sounds: No wheezing.   Abdominal:      General: There is no distension.      Palpations: Abdomen is soft. There is no mass.      Tenderness: There is no abdominal tenderness.   Musculoskeletal:         General: No swelling.      Cervical back: Normal range of motion.   Skin:     Coloration: Skin is not jaundiced.      Findings: No rash.   Neurological:      General: No focal deficit present.      Mental Status: She is alert and oriented to person, place, and time.   Psychiatric:         Mood and Affect: Mood normal.         Thought Content: Thought content normal.       Labs:    Complete Blood Count  Lab Results   Component Value Date    RBC 4.93 01/10/2022    HGB 15.9 " 01/10/2022    HCT 47.0 01/10/2022    MCV 95 01/10/2022    MCH 32.3 (H) 01/10/2022    MCHC 33.8 01/10/2022    RDW 12.5 01/10/2022     01/10/2022    MPV 10.1 01/10/2022    GRAN 5.1 01/10/2022    GRAN 59.0 01/10/2022    LYMPH 2.6 01/10/2022    LYMPH 29.7 01/10/2022    MONO 0.8 01/10/2022    MONO 8.7 01/10/2022    EOS 0.2 01/10/2022    BASO 0.04 01/10/2022    EOSINOPHIL 1.9 01/10/2022    BASOPHIL 0.5 01/10/2022    DIFFMETHOD Automated 01/10/2022       Comprehensive Metabolic Panel  No results found for: GLU, BUN, CREATININE, NA, K, CL, PROT, ALBUMIN, BILITOT, AST, ALKPHOS, CO2, ALT, ANIONGAP, EGFRNONAA, ESTGFRAFRICA    TSH  No results found for: TSH    Imaging:  US Pelvis Comp with Transvag NON-OB (xpd  Narrative: EXAMINATION:  US PELVIS COMP WITH TRANSVAG NON-OB (XPD)    CLINICAL HISTORY:  Pelvic pain    TECHNIQUE:  Multiple static ultrasound images are submitted for interpretation with color flow and spectral Doppler imaging.    COMPARISON:  None    FINDINGS:  The uterus measures 9.5 cm in craniocaudal dimension by 3.6 cm in AP dimension by 5.9 cm in mediolateral dimension.  The endometrial stripe thickness measures 7 mm.  There are several cysts in the cervical portion of the uterus.  One the larger ones measures 7 mm.    The ovaries are normal in appearance.  The left ovary measures 3.6 cm by 2.6 cm x 2.5 cm.  The left ovary has a prominent 19 mm follicle.  The right ovary measures 3.0 cm x 1.8 cm x 1.9 cm.  The right ovary has arterial flow with a peak systolic velocity of 6 centimeters/second.  The right ovary has a prominent 13 mm follicle.    The urinary bladder is normal in appearance.  There is no free fluid visualized within the pelvis.  Impression: 1. There are several cysts in the cervical portion of the uterus. One the larger ones measures 7 mm.  These are characteristic of nabothian cyst.  2. There are prominent follicles in both ovaries.  Otherwise, the ovaries are normal in  appearance.    Electronically signed by: Braydon Thakur MD  Date:    06/30/2022  Time:    11:08      Assessment/Plan:    1. Pharyngitis, unspecified etiology  -     POCT Influenza A/B Molecular  -     POCT COVID-19 Rapid Screening  -     POCT Strep A, Molecular  -     triamcinolone acetonide injection 80 mg           Sabino Hung MD

## 2022-12-21 NOTE — TELEPHONE ENCOUNTER
Called patient to schedule Colonoscopy states was unaware she needed one. Will  call PCP and call us back if she needs one

## 2022-12-27 ENCOUNTER — PATIENT MESSAGE (OUTPATIENT)
Dept: FAMILY MEDICINE | Facility: CLINIC | Age: 47
End: 2022-12-27
Payer: COMMERCIAL

## 2022-12-27 NOTE — TELEPHONE ENCOUNTER
Patient had a cologaurd come back positive and needs a colonoscopy scheduled. Orders have been placed. If you can please call the patient.     Thanks!

## 2022-12-27 NOTE — TELEPHONE ENCOUNTER
Spoke with pt and scheduled colonoscopy 2/3/2023. Prep sent to MD for approval. Instructions sent via portal

## 2022-12-28 RX ORDER — SODIUM, POTASSIUM,MAG SULFATES 17.5-3.13G
1 SOLUTION, RECONSTITUTED, ORAL ORAL DAILY
Qty: 1 KIT | Refills: 0 | Status: SHIPPED | OUTPATIENT
Start: 2022-12-28 | End: 2022-12-30

## 2023-01-17 ENCOUNTER — PATIENT MESSAGE (OUTPATIENT)
Dept: FAMILY MEDICINE | Facility: CLINIC | Age: 48
End: 2023-01-17
Payer: COMMERCIAL

## 2023-01-17 RX ORDER — TIRZEPATIDE 2.5 MG/.5ML
2.5 INJECTION, SOLUTION SUBCUTANEOUS
Qty: 4 PEN | Refills: 3 | Status: SHIPPED | OUTPATIENT
Start: 2023-01-17 | End: 2023-02-13 | Stop reason: DRUGHIGH

## 2023-01-25 DIAGNOSIS — I10 PRIMARY HYPERTENSION: ICD-10-CM

## 2023-01-31 ENCOUNTER — TELEPHONE (OUTPATIENT)
Dept: ENDOSCOPY | Facility: HOSPITAL | Age: 48
End: 2023-01-31
Payer: COMMERCIAL

## 2023-01-31 NOTE — TELEPHONE ENCOUNTER
Spoke with patient about arrival time @ 1000.   Covid test = vacc    Prep instructions reviewed: the day before the procedure, follow a clear liquid diet all day, then start the first 1/2 of prep at 5pm and take 2nd 1/2 of prep @ 0500.  Pt must be completely NPO when prep completed @ 0700.              Medications: Do not take Insulin or oral diabetic medications the day of the procedure.  Take as prescribed: heart, seizure and blood pressure medication in the morning with a sip of water (less than an ounce).  Take any breathing medications and bring inhalers to hospital with you Leave all valuables and jewelry at home.     Wear comfortable clothes to procedure to change into hospital gown You cannot drive for 24 hours after your procedure because you will receive sedation for your procedure to make you comfortable.  A ride must be provided at discharge.

## 2023-02-02 ENCOUNTER — ANESTHESIA (OUTPATIENT)
Dept: ENDOSCOPY | Facility: HOSPITAL | Age: 48
End: 2023-02-02
Payer: COMMERCIAL

## 2023-02-02 ENCOUNTER — ANESTHESIA EVENT (OUTPATIENT)
Dept: ENDOSCOPY | Facility: HOSPITAL | Age: 48
End: 2023-02-02
Payer: COMMERCIAL

## 2023-02-02 ENCOUNTER — HOSPITAL ENCOUNTER (OUTPATIENT)
Facility: HOSPITAL | Age: 48
Discharge: HOME | End: 2023-02-02
Attending: INTERNAL MEDICINE | Admitting: INTERNAL MEDICINE
Payer: COMMERCIAL

## 2023-02-02 DIAGNOSIS — Z12.11 COLON CANCER SCREENING: ICD-10-CM

## 2023-02-02 PROCEDURE — 63600175 PHARM REV CODE 636 W HCPCS: Performed by: NURSE ANESTHETIST, CERTIFIED REGISTERED

## 2023-02-02 PROCEDURE — 27201089 HC SNARE, DISP (ANY): Performed by: INTERNAL MEDICINE

## 2023-02-02 PROCEDURE — D9220A PRA ANESTHESIA: ICD-10-PCS | Mod: ANES,,, | Performed by: ANESTHESIOLOGY

## 2023-02-02 PROCEDURE — D9220A PRA ANESTHESIA: Mod: ANES,,, | Performed by: ANESTHESIOLOGY

## 2023-02-02 PROCEDURE — 25000003 PHARM REV CODE 250: Performed by: INTERNAL MEDICINE

## 2023-02-02 PROCEDURE — D9220A PRA ANESTHESIA: ICD-10-PCS | Mod: CRNA,,, | Performed by: NURSE ANESTHETIST, CERTIFIED REGISTERED

## 2023-02-02 PROCEDURE — 45385 COLONOSCOPY W/LESION REMOVAL: CPT | Mod: ,,, | Performed by: INTERNAL MEDICINE

## 2023-02-02 PROCEDURE — 37000009 HC ANESTHESIA EA ADD 15 MINS: Performed by: INTERNAL MEDICINE

## 2023-02-02 PROCEDURE — 88305 TISSUE EXAM BY PATHOLOGIST: CPT | Performed by: PATHOLOGY

## 2023-02-02 PROCEDURE — 45385 COLONOSCOPY W/LESION REMOVAL: CPT | Performed by: INTERNAL MEDICINE

## 2023-02-02 PROCEDURE — 88305 TISSUE EXAM BY PATHOLOGIST: ICD-10-PCS | Mod: 26,,, | Performed by: PATHOLOGY

## 2023-02-02 PROCEDURE — D9220A PRA ANESTHESIA: Mod: CRNA,,, | Performed by: NURSE ANESTHETIST, CERTIFIED REGISTERED

## 2023-02-02 PROCEDURE — 88305 TISSUE EXAM BY PATHOLOGIST: CPT | Mod: 26,,, | Performed by: PATHOLOGY

## 2023-02-02 PROCEDURE — 37000008 HC ANESTHESIA 1ST 15 MINUTES: Performed by: INTERNAL MEDICINE

## 2023-02-02 PROCEDURE — 45385 PR COLONOSCOPY,REMV LESN,SNARE: ICD-10-PCS | Mod: ,,, | Performed by: INTERNAL MEDICINE

## 2023-02-02 PROCEDURE — 25000003 PHARM REV CODE 250: Performed by: NURSE ANESTHETIST, CERTIFIED REGISTERED

## 2023-02-02 RX ORDER — LIDOCAINE HYDROCHLORIDE 20 MG/ML
INJECTION INTRAVENOUS
Status: DISCONTINUED | OUTPATIENT
Start: 2023-02-02 | End: 2023-02-02

## 2023-02-02 RX ORDER — SODIUM CHLORIDE 0.9 % (FLUSH) 0.9 %
10 SYRINGE (ML) INJECTION
Status: DISCONTINUED | OUTPATIENT
Start: 2023-02-02 | End: 2023-02-02 | Stop reason: HOSPADM

## 2023-02-02 RX ORDER — PROPOFOL 10 MG/ML
VIAL (ML) INTRAVENOUS CONTINUOUS PRN
Status: DISCONTINUED | OUTPATIENT
Start: 2023-02-02 | End: 2023-02-02

## 2023-02-02 RX ORDER — SODIUM CHLORIDE 9 MG/ML
INJECTION, SOLUTION INTRAVENOUS CONTINUOUS
Status: DISCONTINUED | OUTPATIENT
Start: 2023-02-02 | End: 2023-02-02 | Stop reason: HOSPADM

## 2023-02-02 RX ORDER — PROPOFOL 10 MG/ML
VIAL (ML) INTRAVENOUS
Status: DISCONTINUED | OUTPATIENT
Start: 2023-02-02 | End: 2023-02-02

## 2023-02-02 RX ADMIN — PROPOFOL 150 MCG/KG/MIN: 10 INJECTION, EMULSION INTRAVENOUS at 11:02

## 2023-02-02 RX ADMIN — PROPOFOL 70 MG: 10 INJECTION, EMULSION INTRAVENOUS at 11:02

## 2023-02-02 RX ADMIN — PROPOFOL 30 MG: 10 INJECTION, EMULSION INTRAVENOUS at 11:02

## 2023-02-02 RX ADMIN — SODIUM CHLORIDE: 0.9 INJECTION, SOLUTION INTRAVENOUS at 10:02

## 2023-02-02 RX ADMIN — LIDOCAINE HYDROCHLORIDE 40 MG: 20 INJECTION, SOLUTION INTRAVENOUS at 11:02

## 2023-02-02 NOTE — ANESTHESIA POSTPROCEDURE EVALUATION
Anesthesia Post Evaluation    Patient: Jessika Sorto    Procedure(s) Performed: Procedure(s) (LRB):  COLONOSCOPY Suprep (N/A)    Final Anesthesia Type: general      Patient location during evaluation: PACU  Patient participation: Yes- Able to Participate  Level of consciousness: awake and alert  Post-procedure vital signs: reviewed and stable  Pain management: adequate  Airway patency: patent    PONV status at discharge: No PONV  Anesthetic complications: no      Cardiovascular status: blood pressure returned to baseline  Respiratory status: unassisted  Hydration status: euvolemic  Follow-up not needed.          Vitals Value Taken Time   /72 02/02/23 1148   Temp 36.4 °C (97.5 °F) 02/02/23 1133   Pulse 74 02/02/23 1148   Resp 16 02/02/23 1148   SpO2 100 % 02/02/23 1148         Event Time   Out of Recovery 12:15:06         Pain/Dedrick Score: Dedrick Score: 10 (2/2/2023 11:48 AM)

## 2023-02-02 NOTE — TRANSFER OF CARE
"Anesthesia Transfer of Care Note    Patient: Jessika Sorto    Procedure(s) Performed: Procedure(s) (LRB):  COLONOSCOPY Suprep (N/A)    Patient location: GI    Anesthesia Type: general    Transport from OR: Transported from OR on room air with adequate spontaneous ventilation    Post pain: adequate analgesia    Post assessment: no apparent anesthetic complications and tolerated procedure well    Post vital signs: stable    Level of consciousness: responds to stimulation and sedated    Nausea/Vomiting: no nausea/vomiting    Complications: none    Transfer of care protocol was followed      Last vitals:   Visit Vitals  BP (!) 161/74 (BP Location: Left arm, Patient Position: Lying)   Pulse 71   Temp 36.8 °C (98.2 °F) (Skin)   Resp 18   Ht 5' 3" (1.6 m)   Wt 68 kg (150 lb)   SpO2 100%   Breastfeeding No   BMI 26.57 kg/m²     "

## 2023-02-02 NOTE — ANESTHESIA PREPROCEDURE EVALUATION
02/02/2023  Jessika Sorto is a 47 y.o., female.  Pre-operative evaluation for Procedure(s) (LRB):  COLONOSCOPY Suprep (N/A)    Jessika Sorto is a 47 y.o. female     Patient Active Problem List   Diagnosis    Primary hypertension       Review of patient's allergies indicates:  No Known Allergies    No current facility-administered medications on file prior to encounter.     Current Outpatient Medications on File Prior to Encounter   Medication Sig Dispense Refill    losartan (COZAAR) 100 MG tablet Take 1 tablet (100 mg total) by mouth once daily. 90 tablet 3       Past Surgical History:   Procedure Laterality Date    OTHER SURGICAL HISTORY      e sure coils  pregnancy prevention         CBC:  No results found for: WBC, RBC, HGB, HCT, PLT, MCV, MCH, MCHC    CMP:   No results found for: NA, K, CL, CO2, BUN, CREATININE, GLU, MG, PHOS, CALCIUM, ALBUMIN, PROT, ALKPHOS, ALT, AST, BILITOT    INR:  No results found for: PT, INR, PROTIME, APTT      Diagnostic Studies:      EKG:   No results found for this or any previous visit.     2D Echo:  No results found for this or any previous visit.    Stress Test:   No results found for this or any previous visit.           Pre-op Assessment          Review of Systems      Physical Exam  General: Well nourished    Airway:  Mallampati: I / I  Mouth Opening: Normal  TM Distance: Normal  Tongue: Normal  Neck ROM: Normal ROM    Dental:  Intact    Chest/Lungs:  Clear to auscultation    Heart:  Rate: Normal  Rhythm: Regular Rhythm  Sounds: Normal        Anesthesia Plan  Type of Anesthesia, risks & benefits discussed:    Anesthesia Type: MAC, Gen ETT, Gen Supraglottic Airway, Gen Natural Airway  Intra-op Monitoring Plan: Standard ASA Monitors  Post Op Pain Control Plan: multimodal analgesia and peripheral nerve block  Induction:  IV  Informed Consent: Informed consent signed with  the Patient and all parties understand the risks and agree with anesthesia plan.  All questions answered.   ASA Score: 2  Day of Surgery Review of History & Physical: H&P Update referred to the surgeon/provider.    Ready For Surgery From Anesthesia Perspective.     .

## 2023-02-02 NOTE — H&P
Short Stay Endoscopy History and Physical    PCP - Sabino Hung MD    Procedure - Colonoscopy  ASA - II  Mallampati - per anesthesia  History of Anesthesia problems - no  Family history Anesthesia problems - no     HPI:  This is a 47 y.o. female here for evaluation of : Colon cancer screening    Family history of colon cancer - no  History of polyps - na  Change in bowel habits - no  Rectal bleeding - no      ROS:  Constitutional: No fevers, chills, No weight loss  ENT: No allergies  CV: No chest pain  Pulm: No shortness of breath  GI: see HPI  Derm: No rash    Medical History:  has no past medical history on file.    Surgical History:  has a past surgical history that includes OTHER SURGICAL HISTORY.    Family History: family history includes Diabetes in her mother; Hearing loss in her father; Hyperlipidemia in her father; Hypertension in her father; Kidney disease in her sister; No Known Problems in her brother, daughter, sister, and son.. Otherwise no colon cancer, inflammatory bowel disease, or GI malignancies.    Social History:  reports that she has been smoking cigarettes. She has a 7.50 pack-year smoking history. She uses smokeless tobacco. She reports current alcohol use of about 6.0 standard drinks per week. She reports that she does not use drugs.    Review of patient's allergies indicates:  No Known Allergies    Medications:   Medications Prior to Admission   Medication Sig Dispense Refill Last Dose    losartan (COZAAR) 100 MG tablet Take 1 tablet (100 mg total) by mouth once daily. 90 tablet 3     tirzepatide (MOUNJARO) 2.5 mg/0.5 mL PnIj Inject 2.5 mg into the skin every 7 days. 4 pen 3          Objective Findings:    Vital Signs: see nursing notes  Physical Exam:  General Appearance: Well appearing in no acute distress  Eyes:    No scleral icterus  ENT: Neck supple  Lungs: CTA anteriorly  Heart:  S1, S2 normal, no murmurs heard  Abdomen: Soft, non tender, non distended with positive bowel sounds. No  hepatosplenomegaly, ascites, or mass  Extremities: no edema  Skin: No rash      Labs:  Lab Results   Component Value Date    WBC 8.62 01/10/2022    HGB 15.9 01/10/2022    HCT 47.0 01/10/2022     01/10/2022    CHOL 186 05/25/2016    TRIG 139 05/25/2016    HDL 48 05/25/2016    ALT 19 01/10/2022    AST 20 01/10/2022     01/10/2022    K 4.2 01/10/2022     01/10/2022    CREATININE 0.67 01/10/2022    BUN 9 01/10/2022    CO2 28 01/10/2022    TSH 1.328 05/25/2016       I have explained the risks and benefits of endoscopy procedures to the patient including but not limited to bleeding, perforation, infection, and death.    Cordell Nogueira MD

## 2023-02-02 NOTE — [employer] PATIENT INSTRUCTIONS
Discharge Summary/Instructions after an Endoscopic Procedure  Patient Name: Jessika Sorto  Patient MRN: [id number]  Patient YOB: 1975 Thursday, February 2, 2023  Cordell Nogueira MD  Dear patient,  As a result of recent federal legislation (The Federal Cures Act), you may   receive lab or pathology results from your procedure in your [facility]   account before your physician is able to contact you. Your physician or   their representative will relay the results to you with their   recommendations at their soonest availability.  Thank you,  Your health is very important to us during the Covid Crisis. Following your   procedure today, you will receive a daily text for 2 weeks asking about   signs or symptoms of Covid 19.  Please respond to this text when you   receive it so we can follow up and keep you as safe as possible.   RESTRICTIONS:  During your procedure today, you received medications for sedation.  These   medications may affect your judgment, balance and coordination.  Therefore,   for 24 hours, you have the following restrictions:   - DO NOT drive a car, operate machinery, make legal/financial decisions,   sign important papers or drink alcohol.    ACTIVITY:  Today: no heavy lifting, straining or running due to procedural   sedation/anesthesia.  The following day: return to full activity including work.  DIET:  Eat and drink normally unless instructed otherwise.     TREATMENT FOR COMMON SIDE EFFECTS:  - Mild abdominal pain, nausea, belching, bloating or excessive gas:  rest,   eat lightly and use a heating pad.  - Sore Throat: treat with throat lozenges and/or gargle with warm salt   water.  - Because air was used during the procedure, expelling large amounts of air   from your rectum or belching is normal.  - If a bowel prep was taken, you may not have a bowel movement for 1-3 days.    This is normal.  SYMPTOMS TO WATCH FOR AND REPORT TO YOUR PHYSICIAN:  1. Abdominal pain or bloating,  other than gas cramps.  2. Chest pain.  3. Back pain.  4. Signs of infection such as: chills or fever occurring within 24 hours   after the procedure.  5. Rectal bleeding, which would show as bright red, maroon, or black stools.   (A tablespoon of blood from the rectum is not serious, especially if   hemorrhoids are present.)  6. Vomiting.  7. Weakness or dizziness.  GO DIRECTLY TO THE NEAREST EMERGENCY ROOM IF YOU HAVE ANY OF THE FOLLOWING:      Difficulty breathing              Chills and/or fever over 101 F   Persistent vomiting and/or vomiting blood   Severe abdominal pain   Severe chest pain   Black, tarry stools   Bleeding- more than one tablespoon   Any other symptom or condition that you feel may need urgent attention  Your doctor recommends these additional instructions:  If any biopsies were taken, your doctors clinic will contact you in 1 to 2   weeks with any results.  - Discharge patient to home.   - Resume previous diet.   - Continue present medications.   - Await pathology results.   - Repeat colonoscopy in 5 years for surveillance.   - Patient has a contact number available for emergencies.  The signs and   symptoms of potential delayed complications were discussed with the   patient.  Return to normal activities tomorrow.  Written discharge   instructions were provided to the patient.  For questions, problems or results please call your physician - Cordell Nogueira MD.  EMERGENCY PHONE NUMBER: [phone],  LAB RESULTS: [phone]  IF A COMPLICATION OR EMERGENCY SITUATION ARISES AND YOU ARE UNABLE TO REACH   YOUR PHYSICIAN - GO DIRECTLY TO THE EMERGENCY ROOM.  Cordell Nogueira MD  2/2/2023 11:40:42 AM  This report has been verified and signed electronically.  Dear patient,  As a result of recent federal legislation (The Federal Cures Act), you may   receive lab or pathology results from your procedure in your [facility]   account before your physician is able to contact you.  Your physician or   their representative will relay the results to you with their   recommendations at their soonest availability.  Thank you,  [employer]ATION

## 2023-02-02 NOTE — PLAN OF CARE
Patient recovered to baseline.  VSS.  Patient seen by MD at bedside.  Discharge instructions reviewed with patient.  Federal cures act read and explained to patient.  Patient verbalized understanding.  Patient escorted out via WC w/staff member and discharged w/family.

## 2023-02-03 VITALS
BODY MASS INDEX: 26.58 KG/M2 | HEIGHT: 63 IN | SYSTOLIC BLOOD PRESSURE: 123 MMHG | OXYGEN SATURATION: 100 % | DIASTOLIC BLOOD PRESSURE: 72 MMHG | RESPIRATION RATE: 16 BRPM | TEMPERATURE: 98 F | HEART RATE: 74 BPM | WEIGHT: 150 LBS

## 2023-02-09 LAB
FINAL PATHOLOGIC DIAGNOSIS: NORMAL
GROSS: NORMAL
Lab: NORMAL

## 2023-02-10 ENCOUNTER — TELEPHONE (OUTPATIENT)
Dept: GASTROENTEROLOGY | Facility: CLINIC | Age: 48
End: 2023-02-10
Payer: COMMERCIAL

## 2023-02-13 ENCOUNTER — PATIENT MESSAGE (OUTPATIENT)
Dept: FAMILY MEDICINE | Facility: CLINIC | Age: 48
End: 2023-02-13
Payer: COMMERCIAL

## 2023-02-13 RX ORDER — TIRZEPATIDE 5 MG/.5ML
5 INJECTION, SOLUTION SUBCUTANEOUS
Qty: 4 PEN | Refills: 1 | Status: SHIPPED | OUTPATIENT
Start: 2023-02-13 | End: 2023-03-14 | Stop reason: DRUGHIGH

## 2023-02-13 RX ORDER — TIRZEPATIDE 2.5 MG/.5ML
2.5 INJECTION, SOLUTION SUBCUTANEOUS
Qty: 4 PEN | Refills: 3 | Status: CANCELLED | OUTPATIENT
Start: 2023-02-13

## 2023-02-13 NOTE — TELEPHONE ENCOUNTER
Care Due:                  Date            Visit Type   Department     Provider  --------------------------------------------------------------------------------                                MYCHART                              FOLLOWUP/OF  [facility] FAMILY  Last Visit: 12-      [facility] VISIT   MEDICINE       Sabino Hung  Next Visit: None Scheduled  None         None Found                                                            Last  Test          Frequency    Reason                     Performed    Due Date  --------------------------------------------------------------------------------    CMP.........  12 months..  losartan.................  01-   01-    [facility] Embedded Care Gaps. Reference number: [id number]. 2/13/2023   1:40:29 PM CST

## 2023-02-23 ENCOUNTER — TELEPHONE (OUTPATIENT)
Dept: FAMILY MEDICINE | Facility: CLINIC | Age: 48
End: 2023-02-23
Payer: COMMERCIAL

## 2023-02-23 NOTE — TELEPHONE ENCOUNTER
----- Message from Reina Solano sent at 2/23/2023  8:29 AM CST -----  Regarding: same day  Contact: [phone]  Type:  Same Day Appointment Request    Caller is requesting a same day appointment.  Caller declined first available appointment listed below.    Name of Caller: self   When is the first available appointment?   Symptoms: laryngitis, cough, sore throat, chest congestion, sinus pressure. Started 4 days ago. Went to  and was tested for Strep, covid and flu and all were negative.   Best Call Back Number: [phone]   Additional Information:  [facility] - [place], [place] - [address];      Would you like us to add for Monday?

## 2023-03-01 DIAGNOSIS — Z12.31 OTHER SCREENING MAMMOGRAM: ICD-10-CM

## 2023-03-02 ENCOUNTER — PATIENT MESSAGE (OUTPATIENT)
Dept: FAMILY MEDICINE | Facility: CLINIC | Age: 48
End: 2023-03-02
Payer: COMMERCIAL

## 2023-03-02 RX ORDER — FLUCONAZOLE 150 MG/1
150 TABLET ORAL DAILY
Qty: 1 TABLET | Refills: 0 | Status: SHIPPED | OUTPATIENT
Start: 2023-03-02 | End: 2023-03-03

## 2023-03-07 ENCOUNTER — PATIENT MESSAGE (OUTPATIENT)
Dept: ADMINISTRATIVE | Facility: HOSPITAL | Age: 48
End: 2023-03-07
Payer: COMMERCIAL

## 2023-03-14 ENCOUNTER — PATIENT MESSAGE (OUTPATIENT)
Dept: FAMILY MEDICINE | Facility: CLINIC | Age: 48
End: 2023-03-14
Payer: COMMERCIAL

## 2023-03-14 DIAGNOSIS — E88.810 METABOLIC SYNDROME: Primary | ICD-10-CM

## 2023-03-22 ENCOUNTER — OUTSIDE PLACE OF SERVICE (OUTPATIENT)
Dept: CARDIOLOGY | Facility: CLINIC | Age: 48
End: 2023-03-22
Payer: COMMERCIAL

## 2023-03-22 PROCEDURE — 93010 PR ELECTROCARDIOGRAM REPORT: ICD-10-PCS | Mod: ,,, | Performed by: INTERNAL MEDICINE

## 2023-03-22 PROCEDURE — 93010 ELECTROCARDIOGRAM REPORT: CPT | Mod: ,,, | Performed by: INTERNAL MEDICINE

## 2023-03-29 ENCOUNTER — PATIENT MESSAGE (OUTPATIENT)
Dept: FAMILY MEDICINE | Facility: CLINIC | Age: 48
End: 2023-03-29
Payer: COMMERCIAL

## 2023-03-29 RX ORDER — LORAZEPAM 1 MG/1
1 TABLET ORAL 2 TIMES DAILY PRN
Qty: 30 TABLET | Refills: 0 | Status: SHIPPED | OUTPATIENT
Start: 2023-03-29 | End: 2023-08-08

## 2023-04-04 ENCOUNTER — OFFICE VISIT (OUTPATIENT)
Dept: FAMILY MEDICINE | Facility: CLINIC | Age: 48
End: 2023-04-04
Payer: COMMERCIAL

## 2023-04-04 VITALS
BODY MASS INDEX: 27.09 KG/M2 | HEIGHT: 63 IN | HEART RATE: 101 BPM | WEIGHT: 152.88 LBS | SYSTOLIC BLOOD PRESSURE: 118 MMHG | DIASTOLIC BLOOD PRESSURE: 76 MMHG | TEMPERATURE: 98 F | OXYGEN SATURATION: 97 %

## 2023-04-04 DIAGNOSIS — I10 PRIMARY HYPERTENSION: ICD-10-CM

## 2023-04-04 DIAGNOSIS — F41.9 ANXIETY: ICD-10-CM

## 2023-04-04 DIAGNOSIS — Z82.49 FAMILY HISTORY OF HEART ATTACK: Primary | ICD-10-CM

## 2023-04-04 DIAGNOSIS — R07.89 MUSCULAR CHEST PAIN: ICD-10-CM

## 2023-04-04 PROCEDURE — 99214 OFFICE O/P EST MOD 30 MIN: CPT | Mod: S$GLB,,, | Performed by: STUDENT IN AN ORGANIZED HEALTH CARE EDUCATION/TRAINING PROGRAM

## 2023-04-04 PROCEDURE — 99214 PR OFFICE/OUTPT VISIT, EST, LEVL IV, 30-39 MIN: ICD-10-PCS | Mod: S$GLB,,, | Performed by: STUDENT IN AN ORGANIZED HEALTH CARE EDUCATION/TRAINING PROGRAM

## 2023-04-04 RX ORDER — FLUOXETINE HYDROCHLORIDE 20 MG/1
20 CAPSULE ORAL DAILY
Qty: 30 CAPSULE | Refills: 11 | Status: SHIPPED | OUTPATIENT
Start: 2023-04-04 | End: 2023-08-08

## 2023-04-04 NOTE — PROGRESS NOTES
Patient ID: Jessika Sorto is a 47 y.o. female.     Chief Complaint: Annual Exam    Chest Pain   This is a new problem. The current episode started 1 to 4 weeks ago. The onset quality is sudden. The problem occurs intermittently. The problem has been waxing and waning. The pain is present in the substernal region. The pain is at a severity of 3/10. The pain is mild. The quality of the pain is described as tightness. The pain does not radiate. Pertinent negatives include no back pain, cough, exertional chest pressure, fever, headaches, irregular heartbeat, leg pain, lower extremity edema, malaise/fatigue, nausea, near-syncope, palpitations, PND, shortness of breath, sputum production, syncope, vomiting or weakness. The pain is aggravated by nothing. She has tried nothing for the symptoms.      Patient's father passed away while she was speaking to him. This has been very traumatic for her.     Review of Systems  Review of Systems   Constitutional:  Negative for fever and malaise/fatigue.   HENT:  Negative for ear pain and sinus pain.    Eyes:  Negative for discharge.   Respiratory:  Negative for cough, sputum production and shortness of breath.    Cardiovascular:  Positive for chest pain. Negative for palpitations, leg swelling, syncope, PND and near-syncope.   Gastrointestinal:  Negative for diarrhea, nausea and vomiting.   Genitourinary:  Negative for urgency.   Musculoskeletal:  Negative for back pain and myalgias.   Skin:  Negative for rash.   Neurological:  Negative for weakness and headaches.   Psychiatric/Behavioral:  Negative for depression.    All other systems reviewed and are negative.    Currently Medications  Current Outpatient Medications on File Prior to Visit   Medication Sig Dispense Refill    LORazepam (ATIVAN) 1 MG tablet Take 1 tablet (1 mg total) by mouth 2 (two) times daily as needed for Anxiety. 30 tablet 0    losartan (COZAAR) 100 MG tablet Take 1 tablet (100 mg total) by mouth once daily. 90  "tablet 3    tirzepatide 7.5 mg/0.5 mL PnIj Inject 7.5 mg into the skin every 7 days. 4 pen 1     No current facility-administered medications on file prior to visit.       Physical  Exam  Vitals:    04/04/23 0952   BP: 118/76   BP Location: Right arm   Patient Position: Sitting   Pulse: 101   Temp: 98.4 °F (36.9 °C)   SpO2: 97%   Weight: 69.3 kg (152 lb 14.2 oz)   Height: 5' 3" (1.6 m)      Body mass index is 27.08 kg/m².  Wt Readings from Last 3 Encounters:   04/04/23 69.3 kg (152 lb 14.2 oz)   02/02/23 68 kg (150 lb)   12/21/22 73 kg (160 lb 15 oz)       Physical Exam  Vitals and nursing note reviewed.   Constitutional:       General: She is not in acute distress.     Appearance: She is not ill-appearing.   HENT:      Head: Normocephalic and atraumatic.      Right Ear: External ear normal.      Left Ear: External ear normal.      Nose: Nose normal.      Mouth/Throat:      Mouth: Mucous membranes are moist.   Eyes:      Extraocular Movements: Extraocular movements intact.      Conjunctiva/sclera: Conjunctivae normal.   Cardiovascular:      Rate and Rhythm: Normal rate and regular rhythm.      Pulses: Normal pulses.      Heart sounds: No murmur heard.  Pulmonary:      Effort: Pulmonary effort is normal. No respiratory distress.      Breath sounds: No wheezing.   Chest:          Comments: Tenderness to palpation  Abdominal:      General: There is no distension.      Palpations: Abdomen is soft. There is no mass.      Tenderness: There is no abdominal tenderness.   Musculoskeletal:         General: No swelling.      Cervical back: Normal range of motion.   Skin:     Coloration: Skin is not jaundiced.      Findings: No rash.   Neurological:      General: No focal deficit present.      Mental Status: She is alert and oriented to person, place, and time.   Psychiatric:         Mood and Affect: Mood normal.         Thought Content: Thought content normal.       Labs:    Complete Blood Count  Lab Results   Component Value " Date    RBC 4.93 01/10/2022    HGB 15.9 01/10/2022    HCT 47.0 01/10/2022    MCV 95 01/10/2022    MCH 32.3 (H) 01/10/2022    MCHC 33.8 01/10/2022    RDW 12.5 01/10/2022     01/10/2022    MPV 10.1 01/10/2022    GRAN 5.1 01/10/2022    GRAN 59.0 01/10/2022    LYMPH 2.6 01/10/2022    LYMPH 29.7 01/10/2022    MONO 0.8 01/10/2022    MONO 8.7 01/10/2022    EOS 0.2 01/10/2022    BASO 0.04 01/10/2022    EOSINOPHIL 1.9 01/10/2022    BASOPHIL 0.5 01/10/2022    DIFFMETHOD Automated 01/10/2022       Comprehensive Metabolic Panel  No results found for: GLU, BUN, CREATININE, NA, K, CL, PROT, ALBUMIN, BILITOT, AST, ALKPHOS, CO2, ALT, ANIONGAP, EGFRNONAA, ESTGFRAFRICA    TSH  No results found for: TSH    Imaging:  US Pelvis Comp with Transvag NON-OB (xpd  Narrative: EXAMINATION:  US PELVIS COMP WITH TRANSVAG NON-OB (XPD)    CLINICAL HISTORY:  Pelvic pain    TECHNIQUE:  Multiple static ultrasound images are submitted for interpretation with color flow and spectral Doppler imaging.    COMPARISON:  None    FINDINGS:  The uterus measures 9.5 cm in craniocaudal dimension by 3.6 cm in AP dimension by 5.9 cm in mediolateral dimension.  The endometrial stripe thickness measures 7 mm.  There are several cysts in the cervical portion of the uterus.  One the larger ones measures 7 mm.    The ovaries are normal in appearance.  The left ovary measures 3.6 cm by 2.6 cm x 2.5 cm.  The left ovary has a prominent 19 mm follicle.  The right ovary measures 3.0 cm x 1.8 cm x 1.9 cm.  The right ovary has arterial flow with a peak systolic velocity of 6 centimeters/second.  The right ovary has a prominent 13 mm follicle.    The urinary bladder is normal in appearance.  There is no free fluid visualized within the pelvis.  Impression: 1. There are several cysts in the cervical portion of the uterus. One the larger ones measures 7 mm.  These are characteristic of nabothian cyst.  2. There are prominent follicles in both ovaries.  Otherwise, the  ovaries are normal in appearance.    Electronically signed by: Braydon Thakur MD  Date:    06/30/2022  Time:    11:08      Assessment/Plan:    1. Family history of heart attack  -     Exercise Stress - EKG; Future    2. Anxiety  -     FLUoxetine 20 MG capsule; Take 1 capsule (20 mg total) by mouth once daily.  Dispense: 30 capsule; Refill: 11    3. Primary hypertension         Discussed how to stay healthy including: diet, exercise, refraining from smoking and discussed screening exams / tests needed for age, sex and family Hx.      Sabino Hung MD

## 2023-04-05 ENCOUNTER — HOSPITAL ENCOUNTER (OUTPATIENT)
Dept: CARDIOLOGY | Facility: HOSPITAL | Age: 48
Discharge: HOME | End: 2023-04-05
Attending: STUDENT IN AN ORGANIZED HEALTH CARE EDUCATION/TRAINING PROGRAM
Payer: COMMERCIAL

## 2023-04-05 DIAGNOSIS — Z82.49 FAMILY HISTORY OF HEART ATTACK: ICD-10-CM

## 2023-04-05 LAB
CV STRESS BASE HR: 78 BPM
DIASTOLIC BLOOD PRESSURE: 88 MMHG
OHS CV CPX 1 MINUTE RECOVERY HEART RATE: 126 BPM
OHS CV CPX 85 PERCENT MAX PREDICTED HEART RATE MALE: 141
OHS CV CPX ESTIMATED METS: 9
OHS CV CPX MAX PREDICTED HEART RATE: 166
OHS CV CPX PATIENT IS FEMALE: 1
OHS CV CPX PATIENT IS MALE: 0
OHS CV CPX PEAK DIASTOLIC BLOOD PRESSURE: 95 MMHG
OHS CV CPX PEAK HEAR RATE: 157 BPM
OHS CV CPX PEAK RATE PRESSURE PRODUCT: NORMAL
OHS CV CPX PEAK SYSTOLIC BLOOD PRESSURE: 211 MMHG
OHS CV CPX PERCENT MAX PREDICTED HEART RATE ACHIEVED: 94
OHS CV CPX RATE PRESSURE PRODUCT PRESENTING: NORMAL
STRESS ECHO POST EXERCISE DUR MIN: 7 MINUTES
STRESS ECHO POST EXERCISE DUR SEC: 25 SECONDS
SYSTOLIC BLOOD PRESSURE: 169 MMHG

## 2023-04-05 PROCEDURE — 93018 EXERCISE STRESS - EKG (CUPID ONLY): ICD-10-PCS | Mod: ,,, | Performed by: INTERNAL MEDICINE

## 2023-04-05 PROCEDURE — 93016 CV STRESS TEST SUPVJ ONLY: CPT | Mod: ,,, | Performed by: INTERNAL MEDICINE

## 2023-04-05 PROCEDURE — 93018 CV STRESS TEST I&R ONLY: CPT | Mod: ,,, | Performed by: INTERNAL MEDICINE

## 2023-04-05 PROCEDURE — 93016 EXERCISE STRESS - EKG (CUPID ONLY): ICD-10-PCS | Mod: ,,, | Performed by: INTERNAL MEDICINE

## 2023-04-05 PROCEDURE — 93017 CV STRESS TEST TRACING ONLY: CPT

## 2023-04-14 ENCOUNTER — HOSPITAL ENCOUNTER (OUTPATIENT)
Dept: RADIOLOGY | Facility: HOSPITAL | Age: 48
Discharge: HOME | End: 2023-04-14
Attending: STUDENT IN AN ORGANIZED HEALTH CARE EDUCATION/TRAINING PROGRAM
Payer: COMMERCIAL

## 2023-04-14 DIAGNOSIS — Z12.31 OTHER SCREENING MAMMOGRAM: ICD-10-CM

## 2023-04-14 PROCEDURE — 77067 SCR MAMMO BI INCL CAD: CPT | Mod: 26,,, | Performed by: RADIOLOGY

## 2023-04-14 PROCEDURE — 77063 BREAST TOMOSYNTHESIS BI: CPT | Mod: 26,,, | Performed by: RADIOLOGY

## 2023-04-14 PROCEDURE — 77067 MAMMO DIGITAL SCREENING BILAT WITH TOMO: ICD-10-PCS | Mod: 26,,, | Performed by: RADIOLOGY

## 2023-04-14 PROCEDURE — 77063 MAMMO DIGITAL SCREENING BILAT WITH TOMO: ICD-10-PCS | Mod: 26,,, | Performed by: RADIOLOGY

## 2023-04-14 PROCEDURE — 77067 SCR MAMMO BI INCL CAD: CPT | Mod: TC,PO

## 2023-05-01 RX ORDER — LOSARTAN POTASSIUM 100 MG/1
TABLET ORAL
Qty: 90 TABLET | Refills: 2 | Status: SHIPPED | OUTPATIENT
Start: 2023-05-01 | End: 2023-08-08 | Stop reason: SDUPTHER

## 2023-05-01 NOTE — TELEPHONE ENCOUNTER
Refill Routing Note   Medication(s) are not appropriate for processing by [facility] for the following reason(s):      Required labs outdated    [facility] action(s):  Defer Labs due          Appointments  past 12m or future 3m with PCP    Date Provider   Last Visit   4/4/2023 Sabino Hung MD   Next Visit   Visit date not found Sabino Hung MD   ED visits in past 90 days: 0        Note composed:12:29 PM 05/01/2023

## 2023-05-01 NOTE — TELEPHONE ENCOUNTER
Care Due:                  Date            Visit Type   Department     Provider  --------------------------------------------------------------------------------                                MYCHART                              ANNUAL                              CHECKUP/PHY  [facility] FAM[facility]  Last Visit: 04-      [facility]       Sabino Hung  Next Visit: None Scheduled  None         None Found                                                            Last  Test          Frequency    Reason                     Performed    Due Date  --------------------------------------------------------------------------------    CMP.........  12 months..  losartan.................  01-   01-    [facility] Embedded Care Due Messages. Reference number: [id number].   5/01/2023 11:20:35 AM CDT

## 2023-07-31 ENCOUNTER — PATIENT MESSAGE (OUTPATIENT)
Dept: FAMILY MEDICINE | Facility: CLINIC | Age: 48
End: 2023-07-31
Payer: COMMERCIAL

## 2023-07-31 NOTE — TELEPHONE ENCOUNTER
Pt had an anxiety attack Sunday am. She stated that her BP was 141/100. Pt is wanting to see you today. Is a virtual visit okay?

## 2023-08-08 ENCOUNTER — PATIENT MESSAGE (OUTPATIENT)
Dept: FAMILY MEDICINE | Facility: CLINIC | Age: 48
End: 2023-08-08

## 2023-08-08 ENCOUNTER — OFFICE VISIT (OUTPATIENT)
Dept: FAMILY MEDICINE | Facility: CLINIC | Age: 48
End: 2023-08-08
Payer: COMMERCIAL

## 2023-08-08 VITALS
WEIGHT: 154.13 LBS | HEART RATE: 85 BPM | HEIGHT: 63 IN | OXYGEN SATURATION: 97 % | DIASTOLIC BLOOD PRESSURE: 72 MMHG | BODY MASS INDEX: 27.31 KG/M2 | SYSTOLIC BLOOD PRESSURE: 134 MMHG | TEMPERATURE: 98 F

## 2023-08-08 DIAGNOSIS — I10 PRIMARY HYPERTENSION: ICD-10-CM

## 2023-08-08 DIAGNOSIS — K21.9 GASTROESOPHAGEAL REFLUX DISEASE WITHOUT ESOPHAGITIS: Primary | ICD-10-CM

## 2023-08-08 DIAGNOSIS — F41.9 ANXIETY: ICD-10-CM

## 2023-08-08 PROCEDURE — 99214 PR OFFICE/OUTPT VISIT, EST, LEVL IV, 30-39 MIN: ICD-10-PCS | Mod: S$GLB,,, | Performed by: STUDENT IN AN ORGANIZED HEALTH CARE EDUCATION/TRAINING PROGRAM

## 2023-08-08 PROCEDURE — 99214 OFFICE O/P EST MOD 30 MIN: CPT | Mod: S$GLB,,, | Performed by: STUDENT IN AN ORGANIZED HEALTH CARE EDUCATION/TRAINING PROGRAM

## 2023-08-08 RX ORDER — FLUOXETINE 10 MG/1
CAPSULE ORAL
Qty: 15 CAPSULE | Refills: 0 | Status: SHIPPED | OUTPATIENT
Start: 2023-08-08 | End: 2023-08-25

## 2023-08-08 RX ORDER — PANTOPRAZOLE SODIUM 40 MG/1
40 TABLET, DELAYED RELEASE ORAL DAILY
Qty: 30 TABLET | Refills: 11 | Status: SHIPPED | OUTPATIENT
Start: 2023-08-08

## 2023-08-08 RX ORDER — LOSARTAN POTASSIUM 50 MG/1
50 TABLET ORAL 2 TIMES DAILY
Qty: 108 TABLET | Refills: 3 | Status: SHIPPED | OUTPATIENT
Start: 2023-08-08

## 2023-08-08 RX ORDER — LORAZEPAM 1 MG/1
1 TABLET ORAL 2 TIMES DAILY PRN
Qty: 30 TABLET | Refills: 0 | Status: SHIPPED | OUTPATIENT
Start: 2023-08-08

## 2023-08-08 RX ORDER — LOSARTAN POTASSIUM 50 MG/1
50 TABLET ORAL 2 TIMES DAILY
Qty: 180 TABLET | Refills: 1 | Status: CANCELLED | OUTPATIENT
Start: 2023-08-08

## 2023-08-09 NOTE — PROGRESS NOTES
Patient ID: Jessika Sorto is a 48 y.o. female.     Chief Complaint: Follow-up (bp)    Abdominal Pain  This is a chronic problem. The current episode started more than 1 month ago. The onset quality is gradual. The problem occurs daily. The problem has been waxing and waning. The pain is located in the epigastric region. The pain is at a severity of 3/10. The quality of the pain is burning. The abdominal pain does not radiate. Pertinent negatives include no constipation, diarrhea, fever, headaches, hematochezia, hematuria, melena, myalgias, nausea or vomiting. The pain is aggravated by eating. The pain is relieved by Nothing.     She also states that blood pressure has been high. She is concerned that this is a side effect of SSRI. She would like to stop the SSRI.    Review of Systems  Review of Systems   Constitutional:  Negative for fever.   HENT:  Negative for ear pain and sinus pain.    Eyes:  Negative for discharge.   Respiratory:  Negative for cough and shortness of breath.    Cardiovascular:  Negative for chest pain and leg swelling.   Gastrointestinal:  Positive for abdominal pain. Negative for constipation, diarrhea, hematochezia, melena, nausea and vomiting.   Genitourinary:  Negative for hematuria and urgency.   Musculoskeletal:  Negative for myalgias.   Skin:  Negative for rash.   Neurological:  Negative for weakness and headaches.   Psychiatric/Behavioral:  Negative for depression.    All other systems reviewed and are negative.      Currently Medications  Current Outpatient Medications on File Prior to Visit   Medication Sig Dispense Refill    [DISCONTINUED] FLUoxetine 20 MG capsule Take 1 capsule (20 mg total) by mouth once daily. 30 capsule 11    [DISCONTINUED] LORazepam (ATIVAN) 1 MG tablet Take 1 tablet (1 mg total) by mouth 2 (two) times daily as needed for Anxiety. 30 tablet 0    [DISCONTINUED] losartan (COZAAR) 100 MG tablet TAKE 1 TABLET BY MOUTH EVERY DAY 90 tablet 2    [DISCONTINUED]  "tirzepatide 7.5 mg/0.5 mL PnIj Inject 7.5 mg into the skin every 7 days. 4 pen 1     No current facility-administered medications on file prior to visit.       Physical  Exam  Vitals:    08/08/23 1044 08/08/23 1639 08/08/23 2101   BP: (!) 152/96 (!) 150/80 134/72   BP Location: Left arm     Patient Position: Sitting     Pulse: 85     Temp: 98.2 °F (36.8 °C)     SpO2: 97%     Weight: 69.9 kg (154 lb 1.6 oz)     Height: 5' 3" (1.6 m)        Body mass index is 27.3 kg/m².  Wt Readings from Last 3 Encounters:   08/08/23 69.9 kg (154 lb 1.6 oz)   04/04/23 69.3 kg (152 lb 14.2 oz)   02/02/23 68 kg (150 lb)       Physical Exam  Vitals and nursing note reviewed.   Constitutional:       General: She is not in acute distress.     Appearance: She is not ill-appearing.   HENT:      Head: Normocephalic and atraumatic.      Right Ear: External ear normal.      Left Ear: External ear normal.      Nose: Nose normal.      Mouth/Throat:      Mouth: Mucous membranes are moist.   Eyes:      Extraocular Movements: Extraocular movements intact.      Conjunctiva/sclera: Conjunctivae normal.   Cardiovascular:      Rate and Rhythm: Normal rate and regular rhythm.      Pulses: Normal pulses.      Heart sounds: No murmur heard.  Pulmonary:      Effort: Pulmonary effort is normal. No respiratory distress.      Breath sounds: No wheezing.   Abdominal:      General: There is no distension.      Palpations: Abdomen is soft. There is no mass.      Tenderness: There is no abdominal tenderness.   Musculoskeletal:         General: No swelling.      Cervical back: Normal range of motion.   Skin:     Coloration: Skin is not jaundiced.      Findings: No rash.   Neurological:      General: No focal deficit present.      Mental Status: She is alert and oriented to person, place, and time.   Psychiatric:         Mood and Affect: Mood normal.         Thought Content: Thought content normal.         Labs:    Complete Blood Count  Lab Results   Component " "Value Date    RBC 4.93 01/10/2022    HGB 15.9 01/10/2022    HCT 47.0 01/10/2022    MCV 95 01/10/2022    MCH 32.3 (H) 01/10/2022    MCHC 33.8 01/10/2022    RDW 12.5 01/10/2022     01/10/2022    MPV 10.1 01/10/2022    GRAN 5.1 01/10/2022    GRAN 59.0 01/10/2022    LYMPH 2.6 01/10/2022    LYMPH 29.7 01/10/2022    MONO 0.8 01/10/2022    MONO 8.7 01/10/2022    EOS 0.2 01/10/2022    BASO 0.04 01/10/2022    EOSINOPHIL 1.9 01/10/2022    BASOPHIL 0.5 01/10/2022    DIFFMETHOD Automated 01/10/2022       Comprehensive Metabolic Panel  No results found for: "GLU", "BUN", "CREATININE", "NA", "K", "CL", "PROT", "ALBUMIN", "BILITOT", "AST", "ALKPHOS", "CO2", "ALT", "ANIONGAP", "EGFRNONAA", "ESTGFRAFRICA"    TSH  No results found for: "TSH"    Imaging:  Mammo Digital Screening Bilat w/ Marcus  Narrative: Result:  Mammo Digital Screening Bilat w/ Marcus    History:  Patient is 47 y.o. and is seen for a screening mammogram.    Films Compared:  Prior images (if available) were compared.     Findings:   This procedure was performed using tomosynthesis.   Computer-aided detection was utilized in the interpretation of this   examination.    The breasts have scattered areas of fibroglandular density. There is no   evidence of suspicious masses, microcalcifications or architectural   distortion.  Impression:    No mammographic evidence of malignancy.    BI-RADS Category 1: Negative    Recommendation:  Routine screening mammogram in 1 year is recommended.    Your estimated lifetime risk of breast cancer (to age 85) based on   Tyrer-Cuzick risk assessment model is 6.77 %.  According to the American   Cancer Society, patients with a lifetime breast cancer risk of 20% or   higher might benefit from supplemental screening tests. ??       Assessment/Plan:    1. Gastroesophageal reflux disease without esophagitis  -     pantoprazole (PROTONIX) 40 MG tablet; Take 1 tablet (40 mg total) by mouth once daily. Before breakfast  Dispense: 30 tablet; " Refill: 11    2. Anxiety  -     FLUoxetine 10 MG capsule; Take 1 capsule (10 mg total) by mouth once daily for 10 days, THEN 1 capsule (10 mg total) every other day for 7 days.  Dispense: 15 capsule; Refill: 0  -     LORazepam (ATIVAN) 1 MG tablet; Take 1 tablet (1 mg total) by mouth 2 (two) times daily as needed for Anxiety.  Dispense: 30 tablet; Refill: 0    3. Primary hypertension  -     losartan (COZAAR) 50 MG tablet; Take 1 tablet (50 mg total) by mouth 2 (two) times daily.  Dispense: 108 tablet; Refill: 3         Discussed how to stay healthy including: diet, exercise, refraining from smoking and discussed screening exams / tests needed for age, sex and family Hx.    Keep blood pressure log. Send in blood pressure readings in 2 weeks     Sabino Hung MD

## 2024-03-13 DIAGNOSIS — I10 PRIMARY HYPERTENSION: ICD-10-CM

## 2024-05-01 DIAGNOSIS — Z12.31 OTHER SCREENING MAMMOGRAM: ICD-10-CM

## 2024-06-12 ENCOUNTER — HOSPITAL ENCOUNTER (OUTPATIENT)
Dept: RADIOLOGY | Facility: HOSPITAL | Age: 49
Discharge: HOME | End: 2024-06-12
Attending: STUDENT IN AN ORGANIZED HEALTH CARE EDUCATION/TRAINING PROGRAM
Payer: COMMERCIAL

## 2024-06-12 DIAGNOSIS — Z12.31 OTHER SCREENING MAMMOGRAM: ICD-10-CM

## 2024-06-12 PROCEDURE — 77063 BREAST TOMOSYNTHESIS BI: CPT | Mod: 26,,, | Performed by: RADIOLOGY

## 2024-06-12 PROCEDURE — 77063 BREAST TOMOSYNTHESIS BI: CPT | Mod: TC,PN

## 2024-06-12 PROCEDURE — 77067 SCR MAMMO BI INCL CAD: CPT | Mod: 26,,, | Performed by: RADIOLOGY

## 2024-06-14 ENCOUNTER — LAB VISIT (OUTPATIENT)
Dept: LAB | Facility: HOSPITAL | Age: 49
End: 2024-06-14
Attending: STUDENT IN AN ORGANIZED HEALTH CARE EDUCATION/TRAINING PROGRAM
Payer: COMMERCIAL

## 2024-06-14 DIAGNOSIS — I10 PRIMARY HYPERTENSION: ICD-10-CM

## 2024-06-14 LAB
ALBUMIN SERPL BCP-MCNC: 4.2 G/DL (ref 3.5–5.2)
ALP SERPL-CCNC: 52 U/L (ref 38–126)
ALT SERPL W/O P-5'-P-CCNC: 29 U/L (ref 10–44)
ANION GAP SERPL CALC-SCNC: 8 MMOL/L (ref 8–16)
AST SERPL-CCNC: 23 U/L (ref 15–46)
BILIRUB SERPL-MCNC: 0.6 MG/DL (ref 0.1–1)
CALCIUM SERPL-MCNC: 9.1 MG/DL (ref 8.7–10.5)
CHLORIDE SERPL-SCNC: 102 MMOL/L (ref 95–110)
CO2 SERPL-SCNC: 27 MMOL/L (ref 23–29)
CREAT SERPL-MCNC: 0.66 MG/DL (ref 0.5–1.4)
EST. GFR  (NO RACE VARIABLE): >60 ML/MIN/1.73 M^2
GLUCOSE SERPL-MCNC: 122 MG/DL (ref 70–110)
POTASSIUM SERPL-SCNC: 4.3 MMOL/L (ref 3.5–5.1)
PROT SERPL-MCNC: 7.1 G/DL (ref 6–8.4)
SODIUM SERPL-SCNC: 137 MMOL/L (ref 136–145)
UUN UR-MCNC: 11 MG/DL (ref 7–17)

## 2024-06-14 PROCEDURE — 36415 COLL VENOUS BLD VENIPUNCTURE: CPT | Mod: PN | Performed by: STUDENT IN AN ORGANIZED HEALTH CARE EDUCATION/TRAINING PROGRAM

## 2024-06-14 PROCEDURE — 80053 COMPREHEN METABOLIC PANEL: CPT | Mod: PN | Performed by: STUDENT IN AN ORGANIZED HEALTH CARE EDUCATION/TRAINING PROGRAM

## 2024-06-19 ENCOUNTER — PATIENT MESSAGE (OUTPATIENT)
Dept: OTHER | Facility: OTHER | Age: 49
End: 2024-06-19
Payer: COMMERCIAL

## 2024-07-09 ENCOUNTER — PATIENT MESSAGE (OUTPATIENT)
Dept: ADMINISTRATIVE | Facility: OTHER | Age: 49
End: 2024-07-09
Payer: COMMERCIAL

## 2024-07-15 ENCOUNTER — PATIENT MESSAGE (OUTPATIENT)
Dept: FAMILY MEDICINE | Facility: CLINIC | Age: 49
End: 2024-07-15
Payer: COMMERCIAL

## 2024-08-05 DIAGNOSIS — I10 PRIMARY HYPERTENSION: ICD-10-CM

## 2024-08-06 RX ORDER — LOSARTAN POTASSIUM 50 MG/1
50 TABLET ORAL 2 TIMES DAILY
Qty: 180 TABLET | Refills: 0 | Status: SHIPPED | OUTPATIENT
Start: 2024-08-06

## 2024-08-19 DIAGNOSIS — K21.9 GASTROESOPHAGEAL REFLUX DISEASE WITHOUT ESOPHAGITIS: ICD-10-CM

## 2024-08-19 RX ORDER — PANTOPRAZOLE SODIUM 40 MG/1
40 TABLET, DELAYED RELEASE ORAL
Qty: 90 TABLET | Refills: 0 | Status: SHIPPED | OUTPATIENT
Start: 2024-08-19

## 2024-08-19 NOTE — TELEPHONE ENCOUNTER
Refill Decision Note   Jessika Sorto  is requesting a refill authorization.  Brief Assessment and Rationale for Refill:  Approve     Medication Therapy Plan:        Comments:     Note composed:5:59 PM 08/19/2024

## 2024-08-19 NOTE — TELEPHONE ENCOUNTER
No care due was identified.  [facility] Embedded Care Due Messages. Reference number: [id number].   8/19/2024 8:43:56 AM CDT

## 2024-10-10 ENCOUNTER — OFFICE VISIT (OUTPATIENT)
Dept: FAMILY MEDICINE | Facility: CLINIC | Age: 49
End: 2024-10-10
Payer: COMMERCIAL

## 2024-10-10 VITALS
OXYGEN SATURATION: 97 % | DIASTOLIC BLOOD PRESSURE: 82 MMHG | HEART RATE: 89 BPM | BODY MASS INDEX: 30.28 KG/M2 | TEMPERATURE: 98 F | HEIGHT: 63 IN | WEIGHT: 170.88 LBS | SYSTOLIC BLOOD PRESSURE: 122 MMHG

## 2024-10-10 DIAGNOSIS — Z23 NEED FOR INFLUENZA VACCINATION: ICD-10-CM

## 2024-10-10 DIAGNOSIS — F41.9 ANXIETY: ICD-10-CM

## 2024-10-10 DIAGNOSIS — Z13.6 SCREENING FOR CARDIOVASCULAR CONDITION: ICD-10-CM

## 2024-10-10 DIAGNOSIS — R73.9 HYPERGLYCEMIA: Primary | ICD-10-CM

## 2024-10-10 DIAGNOSIS — K21.9 GASTROESOPHAGEAL REFLUX DISEASE WITHOUT ESOPHAGITIS: ICD-10-CM

## 2024-10-10 DIAGNOSIS — I10 PRIMARY HYPERTENSION: ICD-10-CM

## 2024-10-10 PROCEDURE — 90471 IMMUNIZATION ADMIN: CPT | Mod: S$GLB,,, | Performed by: STUDENT IN AN ORGANIZED HEALTH CARE EDUCATION/TRAINING PROGRAM

## 2024-10-10 PROCEDURE — 90656 IIV3 VACC NO PRSV 0.5 ML IM: CPT | Mod: S$GLB,,, | Performed by: STUDENT IN AN ORGANIZED HEALTH CARE EDUCATION/TRAINING PROGRAM

## 2024-10-10 PROCEDURE — 99396 PREV VISIT EST AGE 40-64: CPT | Mod: 25,S$GLB,, | Performed by: STUDENT IN AN ORGANIZED HEALTH CARE EDUCATION/TRAINING PROGRAM

## 2024-10-10 RX ORDER — PANTOPRAZOLE SODIUM 40 MG/1
40 TABLET, DELAYED RELEASE ORAL
Qty: 90 TABLET | Refills: 3 | Status: SHIPPED | OUTPATIENT
Start: 2024-10-10

## 2024-10-10 RX ORDER — LOSARTAN POTASSIUM 50 MG/1
50 TABLET ORAL 2 TIMES DAILY
Qty: 180 TABLET | Refills: 3 | Status: SHIPPED | OUTPATIENT
Start: 2024-10-10

## 2024-10-10 RX ORDER — LORAZEPAM 1 MG/1
1 TABLET ORAL 2 TIMES DAILY PRN
Qty: 30 TABLET | Refills: 0 | Status: SHIPPED | OUTPATIENT
Start: 2024-10-10

## 2024-10-13 NOTE — PROGRESS NOTES
" Patient ID: Jessika Sorto is a 49 y.o. female.     Chief Complaint: Annual Exam    HPI   Patient here for annual wellness exam. She has no complaints today. She denies recent chest pain and shortness of breath. She denies fevers and chills. She reports normal bowel movements. She needs refills of medications.     Review of Systems  Review of Systems   Constitutional:  Negative for fever.   HENT:  Negative for ear pain, hearing loss and sinus pain.    Eyes:  Negative for discharge.   Respiratory:  Negative for cough, shortness of breath and wheezing.    Cardiovascular:  Negative for chest pain, palpitations and leg swelling.   Gastrointestinal:  Negative for constipation, diarrhea, nausea and vomiting.   Genitourinary:  Negative for hematuria and urgency.   Musculoskeletal:  Negative for myalgias.   Skin:  Negative for rash.   Neurological:  Negative for weakness and headaches.   Psychiatric/Behavioral:  Negative for depression.    All other systems reviewed and are negative.      Currently Medications  No current outpatient medications on file prior to visit.     No current facility-administered medications on file prior to visit.       Physical  Exam  Vitals:    10/10/24 1454   BP: 122/82   Pulse: 89   Temp: 97.8 °F (36.6 °C)   SpO2: 97%   Weight: 77.5 kg (170 lb 13.7 oz)   Height: 5' 3" (1.6 m)      Body mass index is 30.27 kg/m².  Wt Readings from Last 3 Encounters:   10/10/24 77.5 kg (170 lb 13.7 oz)   08/08/23 69.9 kg (154 lb 1.6 oz)   04/04/23 69.3 kg (152 lb 14.2 oz)       Physical Exam  Vitals and nursing note reviewed.   Constitutional:       General: She is not in acute distress.     Appearance: She is not ill-appearing.   HENT:      Head: Normocephalic and atraumatic.      Right Ear: External ear normal.      Left Ear: External ear normal.      Nose: Nose normal.      Mouth/Throat:      Mouth: Mucous membranes are moist.   Eyes:      Extraocular Movements: Extraocular movements intact.      " "Conjunctiva/sclera: Conjunctivae normal.   Cardiovascular:      Rate and Rhythm: Normal rate and regular rhythm.      Pulses: Normal pulses.      Heart sounds: No murmur heard.  Pulmonary:      Effort: Pulmonary effort is normal. No respiratory distress.      Breath sounds: No wheezing.   Abdominal:      General: There is no distension.      Palpations: Abdomen is soft. There is no mass.      Tenderness: There is no abdominal tenderness.   Musculoskeletal:         General: No swelling.      Cervical back: Normal range of motion.   Skin:     Coloration: Skin is not jaundiced.      Findings: No rash.   Neurological:      General: No focal deficit present.      Mental Status: She is alert and oriented to person, place, and time.   Psychiatric:         Mood and Affect: Mood normal.         Thought Content: Thought content normal.         Labs:    Complete Blood Count  Lab Results   Component Value Date    RBC 4.93 01/10/2022    HGB 15.9 01/10/2022    HCT 47.0 01/10/2022    MCV 95 01/10/2022    MCH 32.3 (H) 01/10/2022    MCHC 33.8 01/10/2022    RDW 12.5 01/10/2022     01/10/2022    MPV 10.1 01/10/2022    GRAN 5.1 01/10/2022    GRAN 59.0 01/10/2022    LYMPH 2.6 01/10/2022    LYMPH 29.7 01/10/2022    MONO 0.8 01/10/2022    MONO 8.7 01/10/2022    EOS 0.2 01/10/2022    BASO 0.04 01/10/2022    EOSINOPHIL 1.9 01/10/2022    BASOPHIL 0.5 01/10/2022    DIFFMETHOD Automated 01/10/2022       Comprehensive Metabolic Panel  Lab Results   Component Value Date     (H) 06/14/2024    BUN 11 06/14/2024    CREATININE 0.66 06/14/2024     06/14/2024    K 4.3 06/14/2024     06/14/2024    PROT 7.1 06/14/2024    ALBUMIN 4.2 06/14/2024    BILITOT 0.6 06/14/2024    AST 23 06/14/2024    ALKPHOS 52 06/14/2024    CO2 27 06/14/2024    ALT 29 06/14/2024    ANIONGAP 8 06/14/2024       TSH  No results found for: "TSH"    Imaging:  Mammo Digital Screening Bilat w/ Marcus  Narrative: Result:   Mammo Digital Screening Bilat w/ " Marcus     History:  Patient is 48 y.o. and is seen for a screening mammogram.    Films Compared:  Compared to: 04/14/2023 Mammo Digital Screening Bilat w/ Marcus and   02/15/2022 Mammo Digital Screening Bilat w/ Marcus     Findings:  This procedure was performed using tomosynthesis. Computer-aided detection   was utilized in the interpretation of this examination.  There are scattered areas of fibroglandular density. There is no evidence   of suspicious masses, calcifications, or other abnormal findings.  Impression: Bilateral  There is no mammographic evidence of malignancy.    BI-RADS Category:   Overall: 2 - Benign       Recommendation:  Routine screening mammogram in 1 year is recommended.    Your estimated lifetime risk of breast cancer (to age 85) based on   Tyrer-Cuzick risk assessment model is Tyrer-Cuzick: 6.75%. According to   the American Cancer Society, patients with a lifetime breast cancer risk   of 20% or higher might benefit from supplemental screening tests.      Assessment/Plan:    1. Hyperglycemia  -     HEMOGLOBIN A1C; Future; Expected date: 10/10/2024    2. Gastroesophageal reflux disease without esophagitis  -     pantoprazole (PROTONIX) 40 MG tablet; Take 1 tablet (40 mg total) by mouth before breakfast.  Dispense: 90 tablet; Refill: 3    3. Primary hypertension  -     losartan (COZAAR) 50 MG tablet; Take 1 tablet (50 mg total) by mouth 2 (two) times daily.  Dispense: 180 tablet; Refill: 3  -     TSH; Future; Expected date: 10/10/2024    4. Anxiety  -     LORazepam (ATIVAN) 1 MG tablet; Take 1 tablet (1 mg total) by mouth 2 (two) times daily as needed for Anxiety.  Dispense: 30 tablet; Refill: 0  -     CBC Auto Differential; Future  -     Comprehensive metabolic panel; Future; Expected date: 10/10/2024    5. Screening for cardiovascular condition  -     LIPID PANEL; Future; Expected date: 10/10/2024    6. Need for influenza vaccination  -     (VFC) influenza (Flulaval, Fluzone, Fluarix) 45 mcg/0.5  mL IM vaccine (> or = 6 mo) 0.5 mL         Discussed how to stay healthy including: diet, exercise, refraining from smoking and discussed screening exams / tests needed for age, sex and family Hx.      Sabino Hung MD      Answers submitted by the patient for this visit:  Review of Systems Questionnaire (Submitted on 10/3/2024)  activity change: No  trouble swallowing: No  chest tightness: No  difficulty urinating: No  dysphoric mood: No

## 2024-10-18 ENCOUNTER — LAB VISIT (OUTPATIENT)
Dept: LAB | Facility: HOSPITAL | Age: 49
End: 2024-10-18
Attending: STUDENT IN AN ORGANIZED HEALTH CARE EDUCATION/TRAINING PROGRAM
Payer: COMMERCIAL

## 2024-10-18 DIAGNOSIS — Z13.6 SCREENING FOR CARDIOVASCULAR CONDITION: ICD-10-CM

## 2024-10-18 DIAGNOSIS — R73.9 HYPERGLYCEMIA: ICD-10-CM

## 2024-10-18 DIAGNOSIS — F41.9 ANXIETY: ICD-10-CM

## 2024-10-18 DIAGNOSIS — I10 PRIMARY HYPERTENSION: ICD-10-CM

## 2024-10-18 LAB
ALBUMIN SERPL BCP-MCNC: 4.2 G/DL (ref 3.5–5.2)
ALP SERPL-CCNC: 48 U/L (ref 38–126)
ALT SERPL W/O P-5'-P-CCNC: 27 U/L (ref 10–44)
ANION GAP SERPL CALC-SCNC: 4 MMOL/L (ref 8–16)
AST SERPL-CCNC: 21 U/L (ref 15–46)
BASOPHILS # BLD AUTO: 0.04 K/UL (ref 0–0.2)
BASOPHILS NFR BLD: 0.6 % (ref 0–1.9)
BILIRUB SERPL-MCNC: 0.6 MG/DL (ref 0.1–1)
CALCIUM SERPL-MCNC: 9 MG/DL (ref 8.7–10.5)
CHLORIDE SERPL-SCNC: 101 MMOL/L (ref 95–110)
CHOLEST SERPL-MCNC: 204 MG/DL (ref 120–199)
CHOLEST/HDLC SERPL: 3.1 {RATIO} (ref 2–5)
CO2 SERPL-SCNC: 29 MMOL/L (ref 23–29)
CREAT SERPL-MCNC: 0.54 MG/DL (ref 0.5–1.4)
DIFFERENTIAL METHOD BLD: NORMAL
EOSINOPHIL # BLD AUTO: 0.1 K/UL (ref 0–0.5)
EOSINOPHIL NFR BLD: 1.3 % (ref 0–8)
ERYTHROCYTE [DISTWIDTH] IN BLOOD BY AUTOMATED COUNT: 12.7 % (ref 11.5–14.5)
EST. GFR  (NO RACE VARIABLE): >60 ML/MIN/1.73 M^2
ESTIMATED AVG GLUCOSE: 111 MG/DL (ref 68–131)
GLUCOSE SERPL-MCNC: 104 MG/DL (ref 70–110)
HBA1C MFR BLD: 5.5 % (ref 4–5.6)
HCT VFR BLD AUTO: 40.3 % (ref 37–48.5)
HDLC SERPL-MCNC: 66 MG/DL (ref 40–75)
HDLC SERPL: 32.4 % (ref 20–50)
HGB BLD-MCNC: 13.2 G/DL (ref 12–16)
IMM GRANULOCYTES # BLD AUTO: 0.02 K/UL (ref 0–0.04)
IMM GRANULOCYTES NFR BLD AUTO: 0.3 % (ref 0–0.5)
LDLC SERPL CALC-MCNC: 120.4 MG/DL (ref 63–159)
LYMPHOCYTES # BLD AUTO: 1.8 K/UL (ref 1–4.8)
LYMPHOCYTES NFR BLD: 26 % (ref 18–48)
MCH RBC QN AUTO: 30.6 PG (ref 27–31)
MCHC RBC AUTO-ENTMCNC: 32.8 G/DL (ref 32–36)
MCV RBC AUTO: 93 FL (ref 82–98)
MONOCYTES # BLD AUTO: 0.6 K/UL (ref 0.3–1)
MONOCYTES NFR BLD: 9.3 % (ref 4–15)
NEUTROPHILS # BLD AUTO: 4.3 K/UL (ref 1.8–7.7)
NEUTROPHILS NFR BLD: 62.5 % (ref 38–73)
NONHDLC SERPL-MCNC: 138 MG/DL
NRBC BLD-RTO: 0 /100 WBC
PLATELET # BLD AUTO: 341 K/UL (ref 150–450)
PMV BLD AUTO: 10.5 FL (ref 9.2–12.9)
POTASSIUM SERPL-SCNC: 4.3 MMOL/L (ref 3.5–5.1)
PROT SERPL-MCNC: 7.1 G/DL (ref 6–8.4)
RBC # BLD AUTO: 4.32 M/UL (ref 4–5.4)
SODIUM SERPL-SCNC: 134 MMOL/L (ref 136–145)
TRIGL SERPL-MCNC: 88 MG/DL (ref 30–150)
TSH SERPL DL<=0.005 MIU/L-ACNC: 0.79 UIU/ML (ref 0.4–4)
UUN UR-MCNC: 11 MG/DL (ref 7–17)
WBC # BLD AUTO: 6.81 K/UL (ref 3.9–12.7)

## 2024-10-18 PROCEDURE — 85025 COMPLETE CBC W/AUTO DIFF WBC: CPT | Mod: PN | Performed by: STUDENT IN AN ORGANIZED HEALTH CARE EDUCATION/TRAINING PROGRAM

## 2024-10-18 PROCEDURE — 84443 ASSAY THYROID STIM HORMONE: CPT | Mod: PN | Performed by: STUDENT IN AN ORGANIZED HEALTH CARE EDUCATION/TRAINING PROGRAM

## 2024-10-18 PROCEDURE — 83036 HEMOGLOBIN GLYCOSYLATED A1C: CPT | Performed by: STUDENT IN AN ORGANIZED HEALTH CARE EDUCATION/TRAINING PROGRAM

## 2024-10-18 PROCEDURE — 80061 LIPID PANEL: CPT | Performed by: STUDENT IN AN ORGANIZED HEALTH CARE EDUCATION/TRAINING PROGRAM

## 2024-10-18 PROCEDURE — 80053 COMPREHEN METABOLIC PANEL: CPT | Mod: PN | Performed by: STUDENT IN AN ORGANIZED HEALTH CARE EDUCATION/TRAINING PROGRAM

## 2025-01-13 ENCOUNTER — PATIENT MESSAGE (OUTPATIENT)
Dept: FAMILY MEDICINE | Facility: CLINIC | Age: 50
End: 2025-01-13

## 2025-01-20 ENCOUNTER — PATIENT MESSAGE (OUTPATIENT)
Dept: OBSTETRICS AND GYNECOLOGY | Facility: CLINIC | Age: 50
End: 2025-01-20
Payer: COMMERCIAL

## 2025-01-29 ENCOUNTER — E-VISIT (OUTPATIENT)
Dept: FAMILY MEDICINE | Facility: CLINIC | Age: 50
End: 2025-01-29

## 2025-01-29 ENCOUNTER — TELEPHONE (OUTPATIENT)
Dept: FAMILY MEDICINE | Facility: CLINIC | Age: 50
End: 2025-01-29

## 2025-01-29 VITALS — WEIGHT: 170 LBS | HEIGHT: 63 IN | BODY MASS INDEX: 30.12 KG/M2

## 2025-01-29 DIAGNOSIS — E66.811 CLASS 1 OBESITY DUE TO EXCESS CALORIES WITHOUT SERIOUS COMORBIDITY WITH BODY MASS INDEX (BMI) OF 30.0 TO 30.9 IN ADULT: Primary | ICD-10-CM

## 2025-01-29 DIAGNOSIS — E66.09 CLASS 1 OBESITY DUE TO EXCESS CALORIES WITHOUT SERIOUS COMORBIDITY WITH BODY MASS INDEX (BMI) OF 30.0 TO 30.9 IN ADULT: Primary | ICD-10-CM

## 2025-01-29 RX ORDER — TIRZEPATIDE 5 MG/.5ML
5 INJECTION, SOLUTION SUBCUTANEOUS
Qty: 2 ML | Refills: 0 | Status: SHIPPED | OUTPATIENT
Start: 2025-03-01 | End: 2025-03-31

## 2025-01-29 RX ORDER — TIRZEPATIDE 7.5 MG/.5ML
7.5 INJECTION, SOLUTION SUBCUTANEOUS
Qty: 2 ML | Refills: 0 | Status: SHIPPED | OUTPATIENT
Start: 2025-04-01 | End: 2025-05-01

## 2025-01-29 RX ORDER — TIRZEPATIDE 2.5 MG/.5ML
2.5 INJECTION, SOLUTION SUBCUTANEOUS
Qty: 2 ML | Refills: 0 | Status: SHIPPED | OUTPATIENT
Start: 2025-01-29 | End: 2025-02-28

## 2025-01-29 NOTE — PROGRESS NOTES
Patient ID: Jessika Sorto is a 49 y.o. female.    Chief Complaint: General Illness (Entered automatically based on patient selection in [employer].)    The patient initiated a request through [employer] on 1/29/2025 for evaluation and management with a chief complaint of General Illness (Entered automatically based on patient selection in [employer].)     I evaluated the questionnaire submission on 1/29/25.    [facility]-Medication    1/29/2025 10:58 AM CST - Filed by Patient   What do you need help with? Weight Management   Do you agree to participate in an E-Visit? Yes   If you have any of the following symptoms, please present to your local emergency room or call 911: I acknowledge   Medication requests for narcotics will not be addressed via an E-Visit.  Please schedule an appointment. I acknowledge   Do you have any of the following pregnancy-related conditions? None   What best describes your appetite? Average   Do you have specific food cravings? Yes   Describe your specific food cravings: sweets and salty   When you eat, how quickly do you feel full No change   Give an example of what you have eaten in a day in the past 2 weeks:    Breakfast: eggs   Lunch: shrimp wrap, mexican food   Dinner: steak and shrimp   Snacks: miracle cake, chocolate   Drinks: water   Have you exercised in the past week? No   Do you have a personal or family history of thyroid cancer? No   Do you have a personal history of kidney stones? No   Do you have a personal history of seizures? No   Do you have a personal history of pancreatitis? No   I would like to address: Medication for weight loss   Do you want to address a new or existing medication? I would like to start a new medication that I do not already take   What is the name of the medication that you would like to start? Zepbound   Have you taken a similar medication in the past? Yes   What was the name of the similar medication? mounjaro   Why are you no longer on that  medication? Side effects   List the side effects you had with the medication: stomach cramping, bloating, indigestion   Have you stopped taking the medicine? Yes   Are you still having side effects? No    What medical condition is the  medication intended to treat? weight loss   Are you able to take your vital signs? No         Encounter Diagnosis   Name Primary?    Class 1 obesity due to excess calories without serious comorbidity with body mass index (BMI) of 30.0 to 30.9 in adult Yes        No orders of the defined types were placed in this encounter.     Medications Ordered This Encounter   Medications    tirzepatide, weight loss, (ZEPBOUND) 2.5 mg/0.5 mL PnIj     Sig: Inject 2.5 mg into the skin every 7 days.     Dispense:  2 mL     Refill:  0    tirzepatide, weight loss, (ZEPBOUND) 5 mg/0.5 mL PnIj     Sig: Inject 5 mg into the skin every 7 days.     Dispense:  2 mL     Refill:  0    tirzepatide, weight loss, (ZEPBOUND) 7.5 mg/0.5 mL PnIj     Sig: Inject 7.5 mg into the skin every 7 days.     Dispense:  2 mL     Refill:  0        No follow-ups on file.      E-Visit Time Trackin minutes

## 2025-01-29 NOTE — TELEPHONE ENCOUNTER
The patient reached out to me regarding an evisit she did on 1/13/2025  She said she had not heard back from you since 1/14/25    Can you look into this  Did you complete it?

## 2025-02-06 ENCOUNTER — TELEPHONE (OUTPATIENT)
Dept: FAMILY MEDICINE | Facility: CLINIC | Age: 50
End: 2025-02-06
Payer: COMMERCIAL

## 2025-02-06 NOTE — TELEPHONE ENCOUNTER
"----- Message from Addy sent at 2/6/2025  3:38 PM CST -----  Contact: [facility]  Type: Requesting to speak with nurse        Who Called: [facility] [place] - [address] [place]   Phone: [phone]  Fax: [phone]    Regarding: checking the status of PA for medication tirzepatide, weight loss, (ZEPBOUND) 7.5 mg/0.5 mL PnIj. Did doctor contact the patient's insurance?  Would the patient rather a call back or a response via [facility]? Call back  Best Call Back Number:   Additional Information:  [phone] OPTION 2 ASK FOR "LEE"  "

## 2025-03-17 ENCOUNTER — PATIENT MESSAGE (OUTPATIENT)
Dept: ADMINISTRATIVE | Facility: HOSPITAL | Age: 50
End: 2025-03-17
Payer: COMMERCIAL

## 2025-06-27 ENCOUNTER — PATIENT MESSAGE (OUTPATIENT)
Dept: ADMINISTRATIVE | Facility: OTHER | Age: 50
End: 2025-06-27
Payer: COMMERCIAL

## 2025-07-22 ENCOUNTER — HOSPITAL ENCOUNTER (OUTPATIENT)
Dept: RADIOLOGY | Facility: HOSPITAL | Age: 50
Discharge: HOME | End: 2025-07-22
Attending: STUDENT IN AN ORGANIZED HEALTH CARE EDUCATION/TRAINING PROGRAM
Payer: COMMERCIAL

## 2025-07-22 DIAGNOSIS — Z12.31 ENCOUNTER FOR SCREENING MAMMOGRAM FOR BREAST CANCER: ICD-10-CM

## 2025-07-22 PROCEDURE — 77067 SCR MAMMO BI INCL CAD: CPT | Mod: TC,PN

## 2025-07-22 PROCEDURE — 77063 BREAST TOMOSYNTHESIS BI: CPT | Mod: 26,,, | Performed by: RADIOLOGY

## 2025-07-22 PROCEDURE — 77067 SCR MAMMO BI INCL CAD: CPT | Mod: 26,,, | Performed by: RADIOLOGY

## 2025-08-28 ENCOUNTER — PATIENT MESSAGE (OUTPATIENT)
Dept: ADMINISTRATIVE | Facility: OTHER | Age: 50
End: 2025-08-28
Payer: COMMERCIAL